# Patient Record
Sex: FEMALE | Race: AMERICAN INDIAN OR ALASKA NATIVE | NOT HISPANIC OR LATINO | Employment: FULL TIME | URBAN - METROPOLITAN AREA
[De-identification: names, ages, dates, MRNs, and addresses within clinical notes are randomized per-mention and may not be internally consistent; named-entity substitution may affect disease eponyms.]

---
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
## 2021-09-21 ENCOUNTER — TELEPHONE (OUTPATIENT)
Dept: SCHEDULING | Facility: IMAGING CENTER | Age: 21
End: 2021-09-21

## 2021-10-21 ENCOUNTER — OFFICE VISIT (OUTPATIENT)
Dept: MEDICAL GROUP | Facility: LAB | Age: 21
End: 2021-10-21
Payer: OTHER GOVERNMENT

## 2021-10-21 ENCOUNTER — HOSPITAL ENCOUNTER (OUTPATIENT)
Dept: LAB | Facility: MEDICAL CENTER | Age: 21
End: 2021-10-21
Attending: FAMILY MEDICINE

## 2021-10-21 VITALS
TEMPERATURE: 98.5 F | WEIGHT: 202.6 LBS | HEART RATE: 74 BPM | OXYGEN SATURATION: 97 % | HEIGHT: 66 IN | RESPIRATION RATE: 12 BRPM | BODY MASS INDEX: 32.56 KG/M2 | DIASTOLIC BLOOD PRESSURE: 82 MMHG | SYSTOLIC BLOOD PRESSURE: 126 MMHG

## 2021-10-21 DIAGNOSIS — Z13.1 SCREENING FOR DIABETES MELLITUS: ICD-10-CM

## 2021-10-21 DIAGNOSIS — Z13.0 SCREENING FOR DEFICIENCY ANEMIA: ICD-10-CM

## 2021-10-21 DIAGNOSIS — Z13.29 SCREENING FOR THYROID DISORDER: ICD-10-CM

## 2021-10-21 DIAGNOSIS — Z13.21 ENCOUNTER FOR VITAMIN DEFICIENCY SCREENING: ICD-10-CM

## 2021-10-21 DIAGNOSIS — Z13.220 SCREENING FOR HYPERLIPIDEMIA: ICD-10-CM

## 2021-10-21 DIAGNOSIS — H53.9 VISUAL CHANGES: ICD-10-CM

## 2021-10-21 LAB
25(OH)D3 SERPL-MCNC: 11 NG/ML (ref 30–100)
ALBUMIN SERPL BCP-MCNC: 4.2 G/DL (ref 3.2–4.9)
ALBUMIN/GLOB SERPL: 1.3 G/DL
ALP SERPL-CCNC: 92 U/L (ref 30–99)
ALT SERPL-CCNC: 50 U/L (ref 2–50)
ANION GAP SERPL CALC-SCNC: 9 MMOL/L (ref 7–16)
AST SERPL-CCNC: 29 U/L (ref 12–45)
BASOPHILS # BLD AUTO: 0.5 % (ref 0–1.8)
BASOPHILS # BLD: 0.03 K/UL (ref 0–0.12)
BILIRUB SERPL-MCNC: 0.6 MG/DL (ref 0.1–1.5)
BUN SERPL-MCNC: 11 MG/DL (ref 8–22)
CALCIUM SERPL-MCNC: 9.4 MG/DL (ref 8.5–10.5)
CHLORIDE SERPL-SCNC: 104 MMOL/L (ref 96–112)
CHOLEST SERPL-MCNC: 201 MG/DL (ref 100–199)
CO2 SERPL-SCNC: 25 MMOL/L (ref 20–33)
CREAT SERPL-MCNC: 0.65 MG/DL (ref 0.5–1.4)
EOSINOPHIL # BLD AUTO: 0.09 K/UL (ref 0–0.51)
EOSINOPHIL NFR BLD: 1.4 % (ref 0–6.9)
ERYTHROCYTE [DISTWIDTH] IN BLOOD BY AUTOMATED COUNT: 42 FL (ref 35.9–50)
FASTING STATUS PATIENT QL REPORTED: NORMAL
GLOBULIN SER CALC-MCNC: 3.3 G/DL (ref 1.9–3.5)
GLUCOSE SERPL-MCNC: 109 MG/DL (ref 65–99)
HCT VFR BLD AUTO: 47 % (ref 37–47)
HDLC SERPL-MCNC: 34 MG/DL
HGB BLD-MCNC: 15.8 G/DL (ref 12–16)
IMM GRANULOCYTES # BLD AUTO: 0.02 K/UL (ref 0–0.11)
IMM GRANULOCYTES NFR BLD AUTO: 0.3 % (ref 0–0.9)
LDLC SERPL CALC-MCNC: 149 MG/DL
LYMPHOCYTES # BLD AUTO: 2.62 K/UL (ref 1–4.8)
LYMPHOCYTES NFR BLD: 40.9 % (ref 22–41)
MCH RBC QN AUTO: 28.2 PG (ref 27–33)
MCHC RBC AUTO-ENTMCNC: 33.6 G/DL (ref 33.6–35)
MCV RBC AUTO: 83.9 FL (ref 81.4–97.8)
MONOCYTES # BLD AUTO: 0.43 K/UL (ref 0–0.85)
MONOCYTES NFR BLD AUTO: 6.7 % (ref 0–13.4)
NEUTROPHILS # BLD AUTO: 3.21 K/UL (ref 2–7.15)
NEUTROPHILS NFR BLD: 50.2 % (ref 44–72)
NRBC # BLD AUTO: 0 K/UL
NRBC BLD-RTO: 0 /100 WBC
PLATELET # BLD AUTO: 222 K/UL (ref 164–446)
PMV BLD AUTO: 10.6 FL (ref 9–12.9)
POTASSIUM SERPL-SCNC: 4.6 MMOL/L (ref 3.6–5.5)
PROT SERPL-MCNC: 7.5 G/DL (ref 6–8.2)
RBC # BLD AUTO: 5.6 M/UL (ref 4.2–5.4)
SODIUM SERPL-SCNC: 138 MMOL/L (ref 135–145)
TRIGL SERPL-MCNC: 92 MG/DL (ref 0–149)
TSH SERPL DL<=0.005 MIU/L-ACNC: 2.95 UIU/ML (ref 0.38–5.33)
WBC # BLD AUTO: 6.4 K/UL (ref 4.8–10.8)

## 2021-10-21 PROCEDURE — 84443 ASSAY THYROID STIM HORMONE: CPT

## 2021-10-21 PROCEDURE — 36415 COLL VENOUS BLD VENIPUNCTURE: CPT

## 2021-10-21 PROCEDURE — 80053 COMPREHEN METABOLIC PANEL: CPT

## 2021-10-21 PROCEDURE — 80061 LIPID PANEL: CPT

## 2021-10-21 PROCEDURE — 99203 OFFICE O/P NEW LOW 30 MIN: CPT | Performed by: FAMILY MEDICINE

## 2021-10-21 PROCEDURE — 85025 COMPLETE CBC W/AUTO DIFF WBC: CPT

## 2021-10-21 PROCEDURE — 82306 VITAMIN D 25 HYDROXY: CPT

## 2021-10-21 NOTE — PATIENT INSTRUCTIONS
Mediterranean Diet  A Mediterranean diet refers to food and lifestyle choices that are based on the traditions of countries located on the [place]. This way of eating has been shown to help prevent certain conditions and improve outcomes for people who have chronic diseases, like kidney disease and heart disease.  What are tips for following this plan?  Lifestyle  · Cook and eat meals together with your family, when possible.  · Drink enough fluid to keep your urine clear or pale yellow.  · Be physically active every day. This includes:  ? Aerobic exercise like running or swimming.  ? Leisure activities like gardening, walking, or housework.  · Get 7-8 hours of sleep each night.  · If recommended by your health care provider, drink red wine in moderation. This means 1 glass a day for nonpregnant women and 2 glasses a day for men. A glass of wine equals 5 oz (150 mL).  Reading food labels    · Check the serving size of packaged foods. For foods such as rice and pasta, the serving size refers to the amount of cooked product, not dry.  · Check the total fat in packaged foods. Avoid foods that have saturated fat or trans fats.  · Check the ingredients list for added sugars, such as corn syrup.  Shopping  · At the grocery store, buy most of your food from the areas near the walls of the store. This includes:  ? Fresh fruits and vegetables (produce).  ? Grains, beans, nuts, and seeds. Some of these may be available in unpackaged forms or large amounts (in bulk).  ? Fresh seafood.  ? Poultry and eggs.  ? Low-fat dairy products.  · Buy whole ingredients instead of prepackaged foods.  · Buy fresh fruits and vegetables in-season from local farmers markets.  · Buy frozen fruits and vegetables in resealable bags.  · If you do not have access to quality fresh seafood, buy precooked frozen shrimp or canned fish, such as tuna, salmon, or sardines.  · Buy small amounts of raw or cooked vegetables, salads, or olives from  the deli or salad bar at your store.  · Stock your pantry so you always have certain foods on hand, such as olive oil, canned tuna, canned tomatoes, rice, pasta, and beans.  Cooking  · Cook foods with extra-virgin olive oil instead of using butter or other vegetable oils.  · Have meat as a side dish, and have vegetables or grains as your main dish. This means having meat in small portions or adding small amounts of meat to foods like pasta or stew.  · Use beans or vegetables instead of meat in common dishes like chili or lasagna.  · [place] with different cooking methods. Try roasting or broiling vegetables instead of steaming or sautéeing them.  · Add frozen vegetables to soups, stews, pasta, or rice.  · Add nuts or seeds for added healthy fat at each meal. You can add these to yogurt, salads, or vegetable dishes.  · Marinate fish or vegetables using olive oil, lemon juice, garlic, and fresh herbs.  Meal planning    · Plan to eat 1 vegetarian meal one day each week. Try to work up to 2 vegetarian meals, if possible.  · Eat seafood 2 or more times a week.  · Have healthy snacks readily available, such as:  ? Vegetable sticks with hummus.  ? Greek yogurt.  ? Fruit and nut trail mix.  · Eat balanced meals throughout the week. This includes:  ? Fruit: 2-3 servings a day  ? Vegetables: 4-5 servings a day  ? Low-fat dairy: 2 servings a day  ? Fish, poultry, or lean meat: 1 serving a day  ? Beans and legumes: 2 or more servings a week  ? Nuts and seeds: 1-2 servings a day  ? Whole grains: 6-8 servings a day  ? Extra-virgin olive oil: 3-4 servings a day  · Limit red meat and sweets to only a few servings a month  What are my food choices?  · Mediterranean diet  ? Recommended  § Grains: Whole-grain pasta. Brown rice. Bulgar wheat. Polenta. Couscous. Whole-wheat bread. Oatmeal. Quinoa.  § Vegetables: Artichokes. Beets. Broccoli. Cabbage. Carrots. Eggplant. Green beans. Chard. Kale. Spinach. Onions. Leeks. Peas. Squash.  Tomatoes. Peppers. Radishes.  § Fruits: Apples. Apricots. Avocado. Berries. Bananas. Cherries. Dates. Figs. Grapes. Stephie. Melon. Oranges. Peaches. Plums. Pomegranate.  § Meats and other protein foods: Beans. Almonds. Sunflower seeds. Pine nuts. Peanuts. Cod. [place]. Scallops. Shrimp. Tuna. Tilapia. Clams. Oysters. Eggs.  § Dairy: Low-fat milk. Cheese. Greek yogurt.  § Beverages: Water. Red wine. Herbal tea.  § Fats and oils: Extra virgin olive oil. Avocado oil. Grape seed oil.  § Sweets and desserts: Greek yogurt with honey. Baked apples. Poached pears. Trail mix.  § Seasoning and other foods: Basil. Cilantro. Coriander. Cumin. Mint. Parsley. Bhaskar. Rosemary. Tarragon. Garlic. Oregano. Thyme. Pepper. Balsalmic vinegar. Tahini. Hummus. Tomato sauce. Olives. Mushrooms.  ? Limit these  § Grains: Prepackaged pasta or rice dishes. Prepackaged cereal with added sugar.  § Vegetables: Deep fried potatoes (french fries).  § Fruits: Fruit canned in syrup.  § Meats and other protein foods: Beef. Pork. Lamb. Poultry with skin. Hot dogs. Georges.  § Dairy: Ice cream. Sour cream. Whole milk.  § Beverages: Juice. Sugar-sweetened soft drinks. Beer. Liquor and spirits.  § Fats and oils: Butter. Canola oil. Vegetable oil. Beef fat (tallow). Lard.  § Sweets and desserts: Cookies. Cakes. Pies. Candy.  § Seasoning and other foods: Mayonnaise. Premade sauces and marinades.  The items listed may not be a complete list. Talk with your dietitian about what dietary choices are right for you.  Summary  · The Mediterranean diet includes both food and lifestyle choices.  · Eat a variety of fresh fruits and vegetables, beans, nuts, seeds, and whole grains.  · Limit the amount of red meat and sweets that you eat.  · Talk with your health care provider about whether it is safe for you to drink red wine in moderation. This means 1 glass a day for nonpregnant women and 2 glasses a day for men. A glass of wine equals 5 oz (150 mL).  This information  is not intended to replace advice given to you by your health care provider. Make sure you discuss any questions you have with your health care provider.  Document Released: 08/10/2017 Document Revised: 08/17/2017 Document Reviewed: 08/10/2017  Elsevier Patient Education © 2020 Elsevier Inc.

## 2021-10-21 NOTE — ASSESSMENT & PLAN NOTE
Patient has been seeing Dr. Lobato since she was in middle school.  She had some visual changes.  She is unsure if she has amblyopia.  She needs a referral to follow-up with him which she does annually.  We do not have any records on that.  She does wear glasses.

## 2021-10-21 NOTE — PROGRESS NOTES
"Chief Complaint   Patient presents with   • Establish Care         Alia Luis is a 21 y.o. female here to establish care and for evaluation and management of:        HPI:    Visual changes  Patient has been seeing Dr. Lobato since she was in middle school.  She had some visual changes.  She is unsure if she has amblyopia.  She needs a referral to follow-up with him which she does annually.  We do not have any records on that.  She does wear glasses.    Patient is here to establish care.  She does not believe she has ever had any blood work.  She has been healthy overall without any real complaints.  No family history is known because she is adopted.  No Known Allergies    Current medicines (including changes today)  No current outpatient medications on file.     No current facility-administered medications for this visit.     She  has no past medical history on file.  She  has a past surgical history that includes foot surgery (Right).  Social History     Tobacco Use   • Smoking status: Never Smoker   • Smokeless tobacco: Never Used   Vaping Use   • Vaping Use: Never used   Substance Use Topics   • Alcohol use: Yes     Alcohol/week: 1.8 oz     Types: 3 Standard drinks or equivalent per week   • Drug use: Never     Social History     Social History Narrative   • Not on file     Family History   Family history unknown: Yes     No family status information on file.         ROS  No fever or chills.  No nausea or vomiting.  No chest pain or palpitations.  No cough or SOB.  No pain with urination or hematuria.  No black or bloody stools.  All other systems reviewed and are negative     Objective:     /82 (BP Location: Right arm, Patient Position: Sitting, BP Cuff Size: Adult)   Pulse 74   Temp 36.9 °C (98.5 °F) (Temporal)   Resp 12   Ht 1.676 m (5' 6\")   Wt 91.9 kg (202 lb 9.6 oz)   SpO2 97%  Body mass index is 32.7 kg/m².  Physical Exam:      Well developed, well nourished.  Alert, oriented in no " acute distress.  Psych: Eye contact is good, speech goal directed, affect calm  Eyes: conjunctiva non-injected, sclera non-icteric.  Neck Supple.  No adenopathy or masses in the neck or supraclavicular regions. No thyromegaly  Lungs: clear to auscultation bilaterally with good excursion. No wheezes or rhonchi  CV: regular rate and rhythm. No murmur      Assessment and Plan:   The following treatment plan was discussed    1. Visual changes  Refer to ophthalmology for further evaluation and treatment.  We will work to get the past records from Dr. Lobato's office  - REFERRAL TO OPHTHALMOLOGY    2. Screening for deficiency anemia  Screening labs ordered.  Await results for counseling.    - CBC WITH DIFFERENTIAL; Future    3. Screening for diabetes mellitus  Screening labs ordered.  Await results for counseling.    - Comp Metabolic Panel; Future    4. Screening for hyperlipidemia  Screening labs ordered.  Await results for counseling.    - Lipid Profile; Future    5. Encounter for vitamin deficiency screening  Screening labs ordered.  Await results for counseling.    - VITAMIN D,25 HYDROXY; Future    6. Screening for thyroid disorder  Screening labs ordered.  Await results for counseling.    - TSH; Future    7. BMI 32.0-32.9,adult  Encourage weight loss for overall health.  Mediterranean eating plan information given.  Check labs to rule out hyperlipidemia or elevated blood sugars given increased BMI.      Records requested.    Any change or worsening of signs or symptoms, patient encouraged to follow-up or report to the emergency room for further evaluation. Patient understands and agrees.    Followup: Return in about 1 year (around 10/21/2022).

## 2021-10-21 NOTE — LETTER
[facility]  Mela Hein M.D.  [address]  [place] [address]  Fax: [phone]   Authorization for Release/Disclosure of   Protected Health Information   Name: GUILHERME HUERTA : 2000 SSN: [id number]   Address: [address] [place] [address] [place] [address] Phone:    [phone] (home) [phone] (work)   I authorize the entity listed below to release/disclose the PHI below to:   [facility]/Mela Hein M.D. and Mela Hein M.D.   Provider or Entity Name:  Jimbo Murillo Jr., M.D.    Address   City, State, Zip   Phone:      Fax:  : [phone]   Reason for request: continuity of care   Information to be released:    [  ] LAST COLONOSCOPY,  including any PATH REPORT and follow-up  [  ] LAST FIT/COLOGUARD RESULT [  ] LAST DEXA  [  ] LAST MAMMOGRAM  [  ] LAST PAP  [  ] LAST LABS [  ] RETINA EXAM REPORT  [  ] IMMUNIZATION RECORDS  [ X ] Release all info      [  ] Check here and initial the line next to each item to release ALL health information INCLUDING  _____ Care and treatment for drug and / or alcohol abuse  _____ HIV testing, infection status, or AIDS  _____ Genetic Testing    DATES OF SERVICE OR TIME PERIOD TO BE DISCLOSED: _____________  I understand and acknowledge that:  * This Authorization may be revoked at any time by you in writing, except if your health information has already been used or disclosed.  * Your health information that will be used or disclosed as a result of you signing this authorization could be re-disclosed by the recipient. If this occurs, your re-disclosed health information may no longer be protected by State or Federal laws.  * You may refuse to sign this Authorization. Your refusal will not affect your ability to obtain treatment.  * This Authorization becomes effective upon signing and will  on (date) __________.      If no date is indicated, this Authorization will  one (1) year from the signature date.    Name: Guilherme  Toby    Continuity of Care    Date:     10/21/2021       PLEASE FAX REQUESTED RECORDS BACK TO: [phone]

## 2021-10-25 ENCOUNTER — TELEPHONE (OUTPATIENT)
Dept: MEDICAL GROUP | Facility: LAB | Age: 21
End: 2021-10-25

## 2021-10-25 NOTE — TELEPHONE ENCOUNTER
----- Message from Mela Hein M.D. sent at 10/25/2021  3:52 PM PDT -----  Please have patient make an appointment to discuss her lab results.  Mela Hein M.D.

## 2023-10-27 ENCOUNTER — OCCUPATIONAL MEDICINE (OUTPATIENT)
Dept: URGENT CARE | Facility: CLINIC | Age: 23
End: 2023-10-27
Payer: COMMERCIAL

## 2023-10-27 ENCOUNTER — APPOINTMENT (OUTPATIENT)
Dept: RADIOLOGY | Facility: IMAGING CENTER | Age: 23
End: 2023-10-27
Attending: NURSE PRACTITIONER

## 2023-10-27 VITALS
BODY MASS INDEX: 31.82 KG/M2 | OXYGEN SATURATION: 96 % | RESPIRATION RATE: 14 BRPM | HEIGHT: 66 IN | TEMPERATURE: 97.5 F | SYSTOLIC BLOOD PRESSURE: 120 MMHG | HEART RATE: 70 BPM | WEIGHT: 198 LBS | DIASTOLIC BLOOD PRESSURE: 80 MMHG

## 2023-10-27 DIAGNOSIS — S89.91XA KNEE INJURIES, RIGHT, INITIAL ENCOUNTER: ICD-10-CM

## 2023-10-27 DIAGNOSIS — S86.911A KNEE STRAIN, RIGHT, INITIAL ENCOUNTER: ICD-10-CM

## 2023-10-27 PROCEDURE — 3074F SYST BP LT 130 MM HG: CPT | Performed by: NURSE PRACTITIONER

## 2023-10-27 PROCEDURE — 99213 OFFICE O/P EST LOW 20 MIN: CPT | Performed by: NURSE PRACTITIONER

## 2023-10-27 PROCEDURE — 73564 X-RAY EXAM KNEE 4 OR MORE: CPT | Mod: TC,FY,RT | Performed by: RADIOLOGY

## 2023-10-27 PROCEDURE — 3079F DIAST BP 80-89 MM HG: CPT | Performed by: NURSE PRACTITIONER

## 2023-10-27 ASSESSMENT — ENCOUNTER SYMPTOMS: SENSORY CHANGE: 1

## 2023-10-27 NOTE — LETTER
[facility] [place]  [address] - [place] [address]  Phone:  [phone] - Fax:  [phone]   [facility] Progress Report and Disability Certification  Date of Service: 10/27/2023   No Show:  No  Date / Time of Next Visit: 11/3/2023 @ 2:30 [facility]   Claim Information   Patient Name: Jessica Luis  Claim Number:  [id number]   Employer:   [employer] Date of Injury: 8/2/2023     Insurer / TPA: [employer]  ID / SSN:     Occupation: Tech  Diagnosis: Diagnoses of Knee injuries, right, initial encounter and Knee strain, right, initial encounter were pertinent to this visit.    Medical Information   Related to Industrial Injury? Yes    Subjective Complaints:  DOI: 8/2/2023. Patient states she slipped while e-fishing, and came down directly on her right knee. Pain to knee cap area, radiates through joint. Has pain daily, stating some days are worse than others. Reports having numbness in the knee after a full days work. Pain 4/10 at rest, increased with walking and certain positions, such as driving. Knee had given out last Sunday.  Not currently taking medications for symptoms. No hx of right knee injury or pain.   Objective Findings: Physical Exam  Vitals reviewed.   Cardiovascular:      Pulses:           Dorsalis pedis pulses are 2+ on the right side.   Musculoskeletal:         General: Tenderness present. No swelling or deformity. Normal range of motion.      Right knee: No swelling, deformity, effusion, erythema, ecchymosis or lacerations. Normal range of motion. Tenderness present. No medial joint line, lateral joint line, MCL, LCL or patellar tendon tenderness. Normal alignment and normal patellar mobility.      Comments: Mild patella tenderness to palpation. Able to bear weight, steady gait. Positive Adia test.   Neurological:      Mental Status: She is alert and oriented to person, place, and time.         Pre-Existing Condition(s): None reported.   Assessment:   Initial Visit    Status: Additional Care Required  Permanent Disability:No    Plan: MedicationDiagnosticsTransfer Care    Diagnostics: X-ray  Comments:1.  Unremarkable right knee series.    Comments:  - DX-KNEE COMPLETE 4+ RIGHT; Future  - Referral to Occupational Medicine    Disability Information   Status: Released to Restricted Duty    From:  10/27/2023  Through: 11/3/2023 Restrictions are: Temporary   Physical Restrictions   Sitting:    Standing:    Stoopin hrs/day Bending:  < or = to 1 hr/day  Comments:Avoid positions and activities that place excessive pressure on the knee joint: Such activities include: squatting, kneeling, twisting and pivoting, repetitive bending of right knee (eg, stairs).   Squattin hrs/day Walking:    Climbing:  < or = to 1 hr/day Pushing:      Pulling:    Other:    Reaching Above Shoulder (L):   Reaching Above Shoulder (R):       Reaching Below Shoulder (L):    Reaching Below Shoulder (R):      Not to exceed Weight Limits   Carrying(hrs):   Weight Limit(lb): < or = to 25 pounds Lifting(hrs):   Weight  Limit(lb): < or = to 25 pounds   Comments: -Right knee brace  -NSAID's (ibuprofen 600 mg every 6 hours with a snack) and tylenol as directed for pain and inflammation.   -RICE Therapy: Rest, Ice, Compression, Elevation  -Compression with an elastic bandage for swelling.  -Straight leg raising exercises as tolerated.  -Follow up in 1 week.     Possible meniscal injury. Advised continued conservative measures, including incorporating antiinflammatories, with f/u in 1 week. Referred to occupation medicine due to duration of symptoms.     Repetitive Actions   Hands: i.e. Fine Manipulations from Grasping:     Feet: i.e. Operating Foot Controls:     Driving / Operate Machinery:     Health Care Provider’s Original or Electronic Signature  SHAMIKA Pickett Health Care Provider’s Original or Electronic Signature    Fer  DO Sveta MPH     Clinic Name / Location: [facility] [place]  [address]  [place] [address] Clinic Phone Number: Dept: [phone]   Appointment Time: 8:30 Am Visit Start Time: 8:59 AM   Check-In Time:  8:52 Am Visit Discharge Time: 10:06 AM   Original-Treating Physician or Chiropractor    Page 2-Insurer/TPA    Page 3-Employer    Page 4-Employee

## 2023-10-27 NOTE — PROGRESS NOTES
"Subjective     Jessica Luis is a 23 y.o. female who presents with Knee Injury (W/C DOI 8/2/23 Work related RT knee injury, sharp pain around knee cap and around back of the leg.)            DOI: 8/2/2023. Patient states she slipped while e-fishing, and came down directly on her right knee. Pain to knee cap area, radiates through joint. Has pain daily, stating some days are worse than others. Reports having numbness in the knee after a full days work. Pain 4/10 at rest, increased with walking and certain positions, such as driving. Knee had given out last Sunday.  Not currently taking medications for symptoms. No hx of right knee injury or pain.    Knee Injury        Review of Systems   Musculoskeletal:  Positive for joint pain.   Neurological:  Positive for sensory change.   All other systems reviewed and are negative.             Objective     /80   Pulse 70   Temp 36.4 °C (97.5 °F) (Temporal)   Resp 14   Ht 1.676 m (5' 6\")   Wt 89.8 kg (198 lb)   SpO2 96%   BMI 31.96 kg/m²      Physical Exam  Vitals reviewed.   Constitutional:       General: She is not in acute distress.  Cardiovascular:      Pulses:           Dorsalis pedis pulses are 2+ on the right side.   Pulmonary:      Effort: Pulmonary effort is normal.   Musculoskeletal:         General: Tenderness present. No swelling or deformity. Normal range of motion.      Right knee: No swelling, deformity, effusion, erythema, ecchymosis or lacerations. Normal range of motion. Tenderness present. No medial joint line, lateral joint line, MCL, LCL or patellar tendon tenderness. Normal alignment and normal patellar mobility.      Comments: Mild patella tenderness to palpation. Able to bear weight, steady gait. Positive Adia test.   Skin:     General: Skin is warm and dry.      Findings: No bruising or erythema.   Neurological:      Mental Status: She is alert and oriented to person, place, and time.                             Assessment " & Plan     1. Knee injuries, right, initial encounter  - DX-KNEE COMPLETE 4+ RIGHT; Future  - Referral to Occupational Medicine    2. Knee strain, right, initial encounter  - Referral to Occupational Medicine  DX-KNEE COMPLETE 4+ RIGHT    Result Date: 10/27/2023  10/27/2023 9:13 AM HISTORY/REASON FOR EXAM:  Right knee pain after fall 3 months ago. TECHNIQUE/EXAM DESCRIPTION AND NUMBER OF VIEWS:  4 views of the RIGHT knee. COMPARISON: None FINDINGS: There is no significant joint effusion. There is no evidence of displaced  fracture or dislocation. There is no significant degenerative change.     1.  Unremarkable right knee series.    -Right knee brace  -NSAID's (ibuprofen 600 mg every 6 hours with a snack) and tylenol as directed for pain and inflammation.   -RICE Therapy: Rest, Ice, Compression, Elevation  -Compression with an elastic bandage for swelling.  -Straight leg raising exercises as tolerated.  -Follow up in 1 week.     Avoid positions and activities that place excessive pressure on the knee joint: Such activities include: squatting, kneeling, twisting and pivoting, repetitive bending of right knee (eg, stairs).    Possible meniscal injury. Advised continued conservative measures, including incorporating antiinflammatories, with f/u in 1 week. Referred to occupation medicine due to duration of symptoms.

## 2023-10-27 NOTE — LETTER
"    EMPLOYEE’S CLAIM FOR COMPENSATION/ REPORT OF INITIAL TREATMENT  FORM C-4  PLEASE TYPE OR PRINT    EMPLOYEE’S CLAIM - PROVIDE ALL INFORMATION REQUESTED   First Name                    TROY Lopez Last Name  Toby Birthdate                    2000                Sex  []M  [x]F Claim Number (Insurer’s Use Only)  [id number]   Home Address  [address]  Age  23 y.o. 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     Date of Injury (if applicable) 8/2/2023               Hours Injury (if applicable)            am               pm Date Employer Notified  10/20/2023 Last Day of Work after Injury or Occupational Disease  10/26/2023 Supervisor to Whom Injury     Reported  Rnezo Haji   Address or Location of Accident (if applicable)  Work [1]   What were you doing at the time of accident? (if applicable)  E-Fishing    How did this injury or occupational disease occur? (Be specific and answer in detail. Use additional sheet if necessary)  I was E-Fishing and I slept hit my right knee   If you believe that you have an occupational disease, when did you first have knowledge of the disability and its relationship to your employment?  N/A Witnesses to the Accident (if applicable)  Johnnie Littlearest      Nature of Injury or Occupational Disease  Workers' Compensation  Part(s) of Body Injured or Affected  Knee (R) N/A N/A    I " CERTIFY THAT THE ABOVE IS TRUE AND CORRECT TO T HE BEST OF MY KNOWLEDGE AND THAT I HAVE PROVIDED THIS INFORMATION IN ORDER TO OBTAIN THE BENEFITS OF [place], OR CHAPTER 617 OF [place]).  I HEREBY AUTHORIZE ANY PHYSICIAN, CHIROPRACTOR, SURGEON, PRACTITIONER OR ANY OTHER PERSON, ANY HOSPITAL, INCLUDING [facility] OR [facility], ANY  MEDICAL SERVICE ORGANIZATION, ANY INSURANCE COMPANY, OR OTHER INSTITUTION OR ORGANIZATION TO RELEASE TO EACH OTHER, ANY MEDICAL OR OTHER INFORMATION, INCLUDING BENEFITS PAID OR PAYABLE, PERTINENT TO THIS INJURY OR DISEASE, EXCEPT INFORMATION RELATIVE TO DIAGNOSIS, TREATMENT AND/OR COUNSELING FOR AIDS, PSYCHOLOGICAL CONDITIONS, ALCOHOL OR CONTROLLED SUBSTANCES, FOR WHICH I MUST GIVE SPECIFIC AUTHORIZATION.  A PHOTOSTAT OF THIS AUTHORIZATION SHALL BE VALID AS THE ORIGINAL.   10/27/2023  Date [facility] Employee’s Original Signature   THIS REPORT MUST BE COMPLETED AND MAILED WITHIN 3 WORKING DAYS OF TREATMENT   Place  [facility]    Name of Facility  [facility]   Date 10/27/2023 Diagnosis and Description of Injury or Occupational Disease  (S89.91XA) Knee injuries, right, initial encounter  (S86.911A) Knee strain, right, initial encounter  Diagnoses of Knee injuries, right, initial encounter and Knee strain, right, initial encounter were pertinent to this visit. Is there evidence that the injured employee was under the influence of alcohol and/or another controlled substance at the time of accident?  []No  [] Yes (if yes, please explain)   Hour 8:59 AM  No   Treatment: - DX-KNEE COMPLETE 4+ RIGHT; Future  - Referral to Occupational Medicine  -Right knee brace  -NSAID's (ibuprofen 600 mg every 6 hours with a snack) and tylenol as directed for pain and inflammation.   -RICE Therapy: Rest, Ice, Compression, Elevation  -Compression with an elastic bandage for  swelling.  -Straight leg raising exercises as tolerated.  -Follow up in 1 week.     Have you advised the patient to remain off work five days or more?   [] Yes Indicate dates: From   To    []No If no, is the injured employee capable of: [] full duty [] modified duty                                                             No  Yes  If modified duty, specify any limitations / restrictions:  Note d-39                                                                                                                                                                                                                                                                                                                                                                                                               X-Ray Findings: Negative  Comments:Unremarkable right knee series.    From information given by the employee, together with medical evidence, can you directly connect this injury or occupational disease as job incurred?  []Yes   [] No Yes    Is additional medical care by a physician indicated? []Yes [] No  Yes    Do you know of any previous injury or disease contributing to this condition or occupational disease? []Yes [] No (Explain if yes)                          No   Date  10/27/2023 Print Health Care Provider’s Name  SHAMIKA Pickett I certify that the employer’s copy of  this form was delivered to the employer on:   Address  [address] INSURER'S USE ONLY                       [facility]  [address] Provider’s Tax ID Number  [id number]   Telephone  Dept: [phone]    Health Care Provider’s Original or Electronic Signature  e-OC Barr Degree (MD,DO, DC,PARexC,APRN)  APRN  Choose (if applicable)      ORIGINAL - TREATING HEALTHCARE PROVIDER PAGE 2 - INSURER/TPA PAGE 3 - EMPLOYER PAGE 4 - EMPLOYEE             Form C-4 (rev.08/23)        BRIEF DESCRIPTION OF RIGHTS AND  "BENEFITS  (Pursuant to [place]RS 616C.050)    Notice of Injury or Occupational Disease (Incident Report Form C-1): If an injury or occupational disease (OD) arises out of and in the course of employment, you must provide written notice to your employer as soon as practicable, but no later than 7 days after the accident or OD. Your employer shall maintain a sufficient supply of the required forms.    Claim for Compensation (Form C-4): If medical treatment is sought, the form C-4 is available at the place of initial treatment. A completed \"Claim for Compensation\" (Form C-4) must be filed within 90 days after an accident or OD. The treating physician or chiropractor must, within 3 working days after treatment, complete and mail to the employer, the employer's insurer and third-party , the Claim for Compensation.    Medical Treatment: If you require medical treatment for your on-the-job injury or OD, you may be required to select a physician or chiropractor from a list provided by your workers’ compensation insurer, if it has contracted with an Organization for Managed Care (MCO) or Preferred Provider Organization (PPO) or providers of health care. If your employer has not entered into a contract with an MCO or PPO, you may select a physician or chiropractor from the Panel of Physicians and Chiropractors. Any medical costs related to your industrial injury or OD will be paid by your insurer.    Temporary Total Disability (TTD): If your doctor has certified that you are unable to work for a period of at least 5 consecutive days, or 5 cumulative days in a 20-day period, or places restrictions on you that your employer does not accommodate, you may be entitled to TTD compensation.    Temporary Partial Disability (TPD): If the wage you receive upon reemployment is less than the compensation for TTD to which you are entitled, the insurer may be required to pay you TPD compensation to make up the difference. TPD can " only be paid for a maximum of 24 months.    Permanent Partial Disability (PPD): When your medical condition is stable and there is an indication of a PPD as a result of your injury or OD, within 30 days, your insurer must arrange for an evaluation by a rating physician or chiropractor to determine the degree of your PPD. The amount of your PPD award depends on the date of injury, the results of the PPD evaluation, your age and wage.    Permanent Total Disability (PTD): If you are medically certified by a treating physician or chiropractor as permanently and totally disabled and have been granted a PTD status by your insurer, you are entitled to receive monthly benefits not to exceed 66 2/3% of your average monthly wage. The amount of your PTD payments is subject to reduction if you previously received a lump-sum PPD award.    Vocational Rehabilitation Services: You may be eligible for vocational rehabilitation services if you are unable to return to the job due to a permanent physical impairment or permanent restrictions as a result of your injury or occupational disease.    Transportation and Per Heron Reimbursement: You may be eligible for travel expenses and per heron associated with medical treatment.    Reopening: You may be able to reopen your claim if your condition worsens after claim closure.     Appeal Process: If you disagree with a written determination issued by the insurer or the insurer does not respond to your request, you may appeal to the Department of Administration, , by following the instructions contained in your determination letter. You must appeal the determination within 70 days from the date of the determination letter at [address], [place] [address], or [address], [address], [place] [address]. If you disagree with the  decision, you may appeal to the Department of Administration, . You must file  your appeal within 30 days from the date of the  decision letter at [address], [address], [place], [place] [address], or [address], [place], [place] [address]. If you disagree with a decision of an , you may file a petition for judicial review with the District Court. You must do so within 30 days of the Appeal Officer’s decision. You may be represented by an  at your own expense or you may contact the [facility] for possible representation.    [place]  for Injured Workers (NAIW): If you disagree with a  decision, you may request that NAIW represent you without charge at an  Hearing. For information regarding denial of benefits, you may contact the [facility] at: [address], [address], [place] [address], [phone], or [address], [place] [address], [phone]    To File a Complaint with the Division: If you wish to file a complaint with the  of the Division of Industrial Relations (DIR),  please contact the Workers’ Compensation Section, [address], [address], [place], [place] [address], telephone [phone], or [address], [place], [place] [address], telephone [phone].    For assistance with Workers’ Compensation Issues: You may contact the [facility] Office for Consumer Health Assistance, [address], [address], [place] [address], Toll Free [phone], Web site: http://[facility].[place].gov/Programs/REDD E-mail: redd@[facility].[place].gov              __________________________________________________________________                                    _________________            Employee Name / Signature                                                                                                                            Date                                                                                                                                                                                                                               D-2 (rev. 10/20)

## 2023-10-27 NOTE — PATIENT INSTRUCTIONS
-NSAID's (ibuprofen 600 mg every 6 hours with a snack) and tylenol as directed for pain and inflammation.   -RICE Therapy: Rest, Ice, Compression, Elevation  -Compression with an elastic bandage for swelling.  -Straight leg raising exercises as tolerated.  -Follow up in 1 week.     Avoid positions and activities that place excessive pressure on the knee joint: Such activities include: squatting, kneeling, twisting and pivoting, repetitive bending of right knee (eg, stairs).

## 2023-11-03 ENCOUNTER — OCCUPATIONAL MEDICINE (OUTPATIENT)
Dept: OCCUPATIONAL MEDICINE | Facility: CLINIC | Age: 23
End: 2023-11-03
Payer: COMMERCIAL

## 2023-11-03 VITALS
DIASTOLIC BLOOD PRESSURE: 72 MMHG | RESPIRATION RATE: 16 BRPM | TEMPERATURE: 97.3 F | SYSTOLIC BLOOD PRESSURE: 110 MMHG | HEART RATE: 77 BPM | BODY MASS INDEX: 31.34 KG/M2 | OXYGEN SATURATION: 97 % | HEIGHT: 66 IN | WEIGHT: 195 LBS

## 2023-11-03 DIAGNOSIS — S86.911D STRAIN OF RIGHT KNEE, SUBSEQUENT ENCOUNTER: ICD-10-CM

## 2023-11-03 DIAGNOSIS — M25.361 INSTABILITY OF RIGHT KNEE JOINT: ICD-10-CM

## 2023-11-03 PROCEDURE — 99213 OFFICE O/P EST LOW 20 MIN: CPT | Performed by: NURSE PRACTITIONER

## 2023-11-03 PROCEDURE — 3078F DIAST BP <80 MM HG: CPT | Performed by: NURSE PRACTITIONER

## 2023-11-03 PROCEDURE — 3074F SYST BP LT 130 MM HG: CPT | Performed by: NURSE PRACTITIONER

## 2023-11-03 NOTE — LETTER
[address],   [address] [place] [address]  Phone:  [phone] - Fax:  [phone]   Occupational Health [facility] Progress Report and Disability Certification  Date of Service: 11/3/2023   No Show:  No  Date / Time of Next Visit: 12/14/2023 9:45 AM    Claim Information   Patient Name: Jesscia Luis  Claim Number:     Employer:   [employer] Date of Injury: 8/2/2023     Insurer / TPA: [place]  ID / SSN:     Occupation: Tech  Diagnosis: Diagnoses of Strain of right knee, subsequent encounter and Instability of right knee joint were pertinent to this visit.    Medical Information   Related to Industrial Injury? Yes    Subjective Complaints:  DOI: 8/2/2023. Patient states she slipped while e-fishing, and came down directly on her right knee. Today patient states that the symptoms remain unchanged. Pain to knee cap area, radiates through joint. A couple of weeks ago the knee buckled. She has been taking Ibuprofen and wearing a knee brace. She has been able to tolerate light duty. MRI and Orthopedics referral placed.    Objective Findings: Right knee: No swelling, deformity, effusion, erythema, ecchymosis or lacerations. Normal range of motion. Tenderness present. No medial joint line, lateral joint line, MCL, LCL or patellar tendon tenderness. Mild patella tenderness to palpation. Able to bear weight, steady gait. Positive Adia test.    Pre-Existing Condition(s):     Assessment:   Condition Same    Status: Discharged / Care Transfer  Permanent Disability:No    Plan: DiagnosticsTransfer Care    Diagnostics: MRI    Comments:  Follow-up at 6 weeks, unless seen by orthopedics  Restricted duty, per orthopedics  Orthopedic referral placed, transfer care  MRI ordered  Recommend continue with OTC ibuprofen, ice, elevation, and knee brace most of the day  Recommend gentle range of motion and stretching exercises as tolerated     Disability Information   Status: Released to Restricted Duty    From:  11/3/2023  Through: 2023 Restrictions are: Temporary   Physical Restrictions   Sitting:    Standing:    Stooping:    Bending:    Comments:Avoid positions and activities that place excessive pressure on the knee joint: Such activities include: squatting, kneeling, twisting and pivoting, repetitive bending of right knee (eg, stairs)   Squattin hrs/day Walking:    Climbing:  < or = to 1 hr/day Pushing:      Pulling:    Other:    Reaching Above Shoulder (L):   Reaching Above Shoulder (R):       Reaching Below Shoulder (L):    Reaching Below Shoulder (R):      Not to exceed Weight Limits   Carrying(hrs):   Weight Limit(lb): < or = to 25 pounds Lifting(hrs):   Weight  Limit(lb): < or = to 25 pounds   Comments:      Repetitive Actions   Hands: i.e. Fine Manipulations from Grasping:     Feet: i.e. Operating Foot Controls:     Driving / Operate Machinery:     Health Care Provider’s Original or Electronic Signature  SHAMIKA Celis Health Care Provider’s Original or Electronic Signature    Fer Bangura DO MPH     Clinic Name / Location: [address],   [address]  [place] [address] Clinic Phone Number: Dept: [phone]   Appointment Time: 2:30 Pm Visit Start Time: 1:59 PM   Check-In Time:  1:51 Pm Visit Discharge Time:  2:29 PM    Original-Treating Physician or Chiropractor    Page 2-Insurer/TPA    Page 3-Employer    Page 4-Employee

## 2023-11-03 NOTE — PROGRESS NOTES
"Subjective:     Jessica Luis is a 23 y.o. female who presents for Follow-Up (RM 18)      DOI: 8/2/2023. Patient states she slipped while e-fishing, and came down directly on her right knee. Today patient states that the symptoms remain unchanged. Pain to knee cap area, radiates through joint. A couple of weeks ago the knee buckled. She has been taking Ibuprofen and wearing a knee brace. She has been able to tolerate light duty. MRI and Orthopedics referral placed.     ROS: All systems were reviewed on intake form, form was reviewed and signed. See scanned documents in media. Pertinent positives and negatives included in HPI.    PMH: No pertinent past medical history to this problem  MEDS: Medications were reviewed in Epic  ALLERGIES: No Known Allergies  SOCHX: Works as  II at [employer]  FH: No pertinent family history to this problem       Objective:     /72   Pulse 77   Temp 36.3 °C (97.3 °F) (Temporal)   Resp 16   Ht 1.676 m (5' 6\")   Wt 88.5 kg (195 lb)   SpO2 97%   BMI 31.47 kg/m²     [unfilled]    Right knee: No swelling, deformity, effusion, erythema, ecchymosis or lacerations. Normal range of motion. Tenderness present. No medial joint line, lateral joint line, MCL, LCL or patellar tendon tenderness. Mild patella tenderness to palpation. Able to bear weight, steady gait. Positive Adia test.     Assessment/Plan:       1. Strain of right knee, subsequent encounter  - MR-KNEE-W/O RIGHT; Future  - Referral to Radiology  - Referral to Orthopedics    2. Instability of right knee joint  - MR-KNEE-W/O RIGHT; Future  - Referral to Radiology  - Referral to Orthopedics    Released to Restricted Duty FROM 11/3/2023 TO 12/14/2023     Follow-up at 6 weeks, unless seen by orthopedics  Restricted duty, per orthopedics  Orthopedic referral placed, transfer care  MRI ordered  Recommend continue with OTC ibuprofen, ice, elevation, and knee brace most of " the day  Recommend gentle range of motion and stretching exercises as tolerated    Differential diagnosis, natural history, supportive care, and indications for immediate follow-up discussed.    Approximately 25 minutes were spent in reviewing notes, preparing for visit, obtaining history, exam and evaluation, patient counseling/education and post visit documentation/orders.

## 2023-12-14 ENCOUNTER — OCCUPATIONAL MEDICINE (OUTPATIENT)
Dept: OCCUPATIONAL MEDICINE | Facility: CLINIC | Age: 23
End: 2023-12-14
Payer: COMMERCIAL

## 2023-12-14 DIAGNOSIS — S86.911D STRAIN OF RIGHT KNEE, SUBSEQUENT ENCOUNTER: ICD-10-CM

## 2023-12-14 DIAGNOSIS — M25.361 INSTABILITY OF RIGHT KNEE JOINT: ICD-10-CM

## 2023-12-14 PROCEDURE — 99213 OFFICE O/P EST LOW 20 MIN: CPT | Performed by: NURSE PRACTITIONER

## 2023-12-14 ASSESSMENT — ENCOUNTER SYMPTOMS
WEAKNESS: 1
CARDIOVASCULAR NEGATIVE: 1
PSYCHIATRIC NEGATIVE: 1
CONSTITUTIONAL NEGATIVE: 1
MYALGIAS: 1
TINGLING: 0
RESPIRATORY NEGATIVE: 1
SENSORY CHANGE: 0

## 2023-12-14 NOTE — LETTER
[address],   [address] [place] [address]  Phone:  [phone] - Fax:  [phone]   Occupational Health [facility] Progress Report and Disability Certification  Date of Service: 12/14/2023   No Show:  No  Date / Time of Next Visit: 1/25/2024 @ 9:30 AM   Claim Information   Patient Name: Jessica Luis  Claim Number:     Employer:   [employer] Date of Injury: 8/2/2023     Insurer / TPA: [place]  ID / SSN:     Occupation: Tech  Diagnosis: Diagnoses of Strain of right knee, subsequent encounter and Instability of right knee joint were pertinent to this visit.    Medical Information   Related to Industrial Injury? Yes    Subjective Complaints:  DOI: 8/2/2023. Patient states she slipped while e-fishing, and came down directly on her right knee. Today patient states that the symptoms remain unchanged, maybe a little worse. Pain to knee cap area, radiates through joint. Continued intermittent knee buckling. She has been taking Ibuprofen and wearing a knee brace. She has been able to tolerate light duty. MRI and Orthopedics pending. Plan of care discussed with patient.    Objective Findings:    Right knee: No swelling, deformity, effusion, erythema, ecchymosis or lacerations. Normal range of motion. Tenderness present. No medial joint line, lateral joint line, MCL, LCL or patellar tendon tenderness. Mild patella tenderness to palpation. Able to bear weight, steady gait. Positive Adia test.      Pre-Existing Condition(s):     Assessment:   Condition Same    Status: Discharged / Care Transfer  Permanent Disability:No    Plan: Transfer CareDiagnostics    Diagnostics: MRI    Comments:  Follow-up at 6 weeks, unless seen by orthopedics  Restricted duty, per orthopedics  Orthopedic referral pending, transfer care  MRI pending  Recommend continue with OTC ibuprofen, ice, elevation, and knee brace most of the day  Recommend gentle  range of motion and stretching exercises as tolerated      Disability Information   Status: Released to Restricted Duty    From:  2023  Through: 2024 Restrictions are: Temporary   Physical Restrictions   Sitting:    Standing:    Stooping:    Bending:    Comments:Avoid positions and activities that place excessive pressure on the knee joint: Such activities include: squatting, kneeling, twisting and pivoting, repetitive bending of right knee (eg, stairs)   Squattin hrs/day Walking:    Climbing:  < or = to 1 hr/day Pushing:      Pulling:    Other:    Reaching Above Shoulder (L):   Reaching Above Shoulder (R):       Reaching Below Shoulder (L):    Reaching Below Shoulder (R):      Not to exceed Weight Limits   Carrying(hrs):   Weight Limit(lb): < or = to 25 pounds Lifting(hrs):   Weight  Limit(lb): < or = to 25 pounds   Comments:      Repetitive Actions   Hands: i.e. Fine Manipulations from Grasping:     Feet: i.e. Operating Foot Controls:     Driving / Operate Machinery:     Health Care Provider’s Original or Electronic Signature  SHAMIKA Celis Health Care Provider’s Original or Electronic Signature    Fer Bangura DO MPH     Clinic Name / Location: [address],   [address] [facility]  [place] [address] Clinic Phone Number: Dept: [phone]   Appointment Time: 9:45 Am Visit Start Time: 10:05 AM   Check-In Time:  9:48 Am Visit Discharge Time:  10:23 AM   Original-Treating Physician or Chiropractor    Page 2-Insurer/TPA    Page 3-Employer    Page 4-Employee

## 2023-12-14 NOTE — PROGRESS NOTES
Subjective:     Jessica Luis is a 23 y.o. female who presents for Other      DOI: 8/2/2023. Patient states she slipped while e-fishing, and came down directly on her right knee. Today patient states that the symptoms remain unchanged, maybe a little worse. Pain to knee cap area, radiates through joint. Continued intermittent knee buckling. She has been taking Ibuprofen and wearing a knee brace. She has been able to tolerate light duty. MRI and Orthopedics pending. Plan of care discussed with patient.     Review of Systems   Constitutional: Negative.    Respiratory: Negative.     Cardiovascular: Negative.    Musculoskeletal:  Positive for joint pain and myalgias.   Skin: Negative.    Neurological:  Positive for weakness. Negative for tingling and sensory change.   Psychiatric/Behavioral: Negative.         SOCHX: Works as  II at [employer]  FH: No pertinent family history to this problem       Objective:     There were no vitals taken for this visit.    Constitutional: Patient is in no acute distress. Appears well-developed and well-nourished.   Cardiovascular: Normal rate.    Pulmonary/Chest: Effort normal. No respiratory distress.   Neurological: Patient is alert and oriented to person, place, and time.   Skin: Skin is warm and dry.   Psychiatric: Normal mood and affect. Behavior is normal.        Right knee: No swelling, deformity, effusion, erythema, ecchymosis or lacerations. Normal range of motion. Tenderness present. No medial joint line, lateral joint line, MCL, LCL or patellar tendon tenderness. Mild patella tenderness to palpation. Able to bear weight, steady gait. Positive Adia test.       Assessment/Plan:       1. Strain of right knee, subsequent encounter    2. Instability of right knee joint    Released to Restricted Duty FROM 12/14/2023 TO 1/25/2024       Follow-up at 6 weeks, unless seen by orthopedics  Restricted duty, per  orthopedics  Orthopedic referral pending, transfer care  MRI pending  Recommend continue with OTC ibuprofen, ice, elevation, and knee brace most of the day  Recommend gentle range of motion and stretching exercises as tolerated      Differential diagnosis, natural history, supportive care, and indications for immediate follow-up discussed.    Approximately 25 minutes was spent in preparing for visit, obtaining history, exam and evaluation, patient counseling/education and post visit documentation/orders.

## 2024-01-25 ENCOUNTER — OCCUPATIONAL MEDICINE (OUTPATIENT)
Dept: OCCUPATIONAL MEDICINE | Facility: CLINIC | Age: 24
End: 2024-01-25
Payer: COMMERCIAL

## 2024-01-25 VITALS
OXYGEN SATURATION: 98 % | RESPIRATION RATE: 16 BRPM | SYSTOLIC BLOOD PRESSURE: 116 MMHG | WEIGHT: 197.6 LBS | HEART RATE: 73 BPM | BODY MASS INDEX: 31.76 KG/M2 | TEMPERATURE: 98.9 F | DIASTOLIC BLOOD PRESSURE: 76 MMHG | HEIGHT: 66 IN

## 2024-01-25 DIAGNOSIS — S86.911D STRAIN OF RIGHT KNEE, SUBSEQUENT ENCOUNTER: ICD-10-CM

## 2024-01-25 DIAGNOSIS — M25.361 INSTABILITY OF RIGHT KNEE JOINT: ICD-10-CM

## 2024-01-25 PROCEDURE — 3074F SYST BP LT 130 MM HG: CPT | Performed by: NURSE PRACTITIONER

## 2024-01-25 PROCEDURE — 99213 OFFICE O/P EST LOW 20 MIN: CPT | Performed by: NURSE PRACTITIONER

## 2024-01-25 PROCEDURE — 3078F DIAST BP <80 MM HG: CPT | Performed by: NURSE PRACTITIONER

## 2024-01-25 ASSESSMENT — ENCOUNTER SYMPTOMS
PSYCHIATRIC NEGATIVE: 1
RESPIRATORY NEGATIVE: 1
CONSTITUTIONAL NEGATIVE: 1
MYALGIAS: 1
TINGLING: 0
ROS SKIN COMMENTS: SWELLING
WEAKNESS: 1
CARDIOVASCULAR NEGATIVE: 1
SENSORY CHANGE: 0

## 2024-01-25 NOTE — LETTER
[address],   [address] [place] [address]  Phone:  [phone] - Fax:  [phone]   [facility] Progress Report and Disability Certification  Date of Service: 1/25/2024   No Show:  No  Date / Time of Next Visit: 3/7/2024@7:45AM   Claim Information   Patient Name: Jessica Luis  Claim Number:     Employer:   [employer]   Date of Injury: 8/2/2023     Insurer / TPA: Kerry [place]  ID / SSN:     Occupation: Tech  Diagnosis: Diagnoses of Strain of right knee, subsequent encounter and Instability of right knee joint were pertinent to this visit.    Medical Information   Related to Industrial Injury? Yes    Subjective Complaints:  DOI: 8/2/2023. Patient states she slipped while e-fishing, and came down directly on her right knee. Today patient states that the symptoms remain unchanged, maybe a little worse. Pain to knee cap area, radiates through joint. Continued intermittent knee buckling. She has been taking Ibuprofen and wearing a knee brace. She has been able to tolerate light duty. MRI results reviewed with patient. Orthopedics pending;  to schedule per notes. Plan of care discussed with patient.    Objective Findings: Right knee: No swelling, deformity, effusion, erythema, ecchymosis or lacerations. Normal range of motion. Tenderness present. No medial joint line, lateral joint line, MCL, LCL or patellar tendon tenderness. Mild patella tenderness to palpation. Able to bear weight, steady gait. Positive Adia test.     MRI Knee:  1.  Lateral patellar tilt and subluxation along with patella christy suggestion patellofemoral pain syndrome.  There is partial-thickness chondromalacia along the lateral facet of the patella.  There is associated free femoral fat pad impingement.  2.  Patellar tendon/lateral femoral condyle friction syndrome.  3.  No internal derangement.   Pre-Existing Condition(s):      Assessment:   Condition Same    Status: Discharged / Care Transfer  Permanent Disability:No    [facility]    Diagnostics:      Comments:  Follow-up at 6 weeks, unless seen by orthopedics  Restricted duty, per orthopedics  Orthopedic pending scheduling by , transfer care  MRI completing  Physical therapy appointments as scheduled  Recommend continue with OTC ibuprofen, ice, elevation, and knee brace most of the day  Recommend gentle range of motion and stretching exercises as tolerated      Disability Information   Status: Released to Restricted Duty    From:  1/25/2024  Through: 3/7/2024 Restrictions are: Temporary   Physical Restrictions   Sitting:    Standing:    Stooping:    Bending:    Comments:Avoid positions and activities that place excessive pressure on the knee joint: Such activities include: squatting, kneeling, twisting and pivoting, repetitive bending of right knee (eg, stairs)   Squatting:  < or = to 1 hr/day Walking:    Climbing:  < or = to 1 hr/day Pushing:      Pulling:    Other:    Reaching Above Shoulder (L):   Reaching Above Shoulder (R):       Reaching Below Shoulder (L):    Reaching Below Shoulder (R):      Not to exceed Weight Limits   Carrying(hrs):   Weight Limit(lb): < or = to 25 pounds Lifting(hrs):   Weight  Limit(lb): < or = to 25 pounds   Comments:      Repetitive Actions   Hands: i.e. Fine Manipulations from Grasping:     Feet: i.e. Operating Foot Controls:     Driving / Operate Machinery:     Health Care Provider’s Original or Electronic Signature  SHAMIKA Celis Health Care Provider’s Original or Electronic Signature    Fer Bangura DO MPH     Clinic Name / Location: [place] [address]  [place] [address] Clinic Phone Number: Dept: [phone]   Appointment Time: 9:30 Am Visit Start Time:    Check-In Time:  8:52 Am Visit Discharge Time: 9:28 AM   Original-Treating Physician or Chiropractor    Page 2-Insurer/TPA     Page 3-Employer    Page 4-Employee

## 2024-01-25 NOTE — PROGRESS NOTES
"Subjective:     Jessica Luis is a 23 y.o. female who presents for Follow-Up ( - Follow Up)      DOI: 8/2/2023. Patient states she slipped while e-fishing, and came down directly on her right knee. Today patient states that the symptoms remain unchanged, maybe a little worse. Pain to knee cap area, radiates through joint. Continued intermittent knee buckling. She has been taking Ibuprofen and wearing a knee brace. She has been able to tolerate light duty. MRI results reviewed with patient. Orthopedics pending;  to schedule per notes. Plan of care discussed with patient.     Review of Systems   Constitutional: Negative.    Respiratory: Negative.     Cardiovascular: Negative.    Musculoskeletal:  Positive for joint pain and myalgias.   Skin:         Swelling    Neurological:  Positive for weakness. Negative for tingling and sensory change.   Psychiatric/Behavioral: Negative.         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 Appears well-developed and well-nourished.   Cardiovascular: Normal rate.    Pulmonary/Chest: Effort normal. No respiratory distress.   Neurological: Patient is alert and oriented to person, place, and time.   Skin: Skin is warm and dry.   Psychiatric: Normal mood and affect. Behavior is normal.     Right knee: No swelling, deformity, effusion, erythema, ecchymosis or lacerations. Normal range of motion. Tenderness present. No medial joint line, lateral joint line, MCL, LCL or patellar tendon tenderness. Mild patella tenderness to palpation. Able to bear weight, steady gait. Positive Adia test. "     MRI Knee:  1.  Lateral patellar tilt and subluxation along with patella christy suggestion patellofemoral pain syndrome.  There is partial-thickness chondromalacia along the lateral facet of the patella.  There is associated free femoral fat pad impingement.  2.  Patellar tendon/lateral femoral condyle friction syndrome.  3.  No internal derangement.    Assessment/Plan:       1. Strain of right knee, subsequent encounter    2. Instability of right knee joint    Released to Restricted Duty FROM 1/25/2024 TO 3/7/2024       Follow-up at 6 weeks, unless seen by orthopedics  Restricted duty, per orthopedics  Orthopedic pending scheduling by , transfer care  MRI completing  Physical therapy appointments as scheduled  Recommend continue with OTC ibuprofen, ice, elevation, and knee brace most of the day  Recommend gentle range of motion and stretching exercises as tolerated      Differential diagnosis, natural history, supportive care, and indications for immediate follow-up discussed.    Approximately 25 minutes was spent in preparing for visit, obtaining history, exam and evaluation, patient counseling/education and post visit documentation/orders.

## 2024-03-07 ENCOUNTER — OCCUPATIONAL MEDICINE (OUTPATIENT)
Dept: OCCUPATIONAL MEDICINE | Facility: CLINIC | Age: 24
End: 2024-03-07
Payer: COMMERCIAL

## 2024-03-07 VITALS
RESPIRATION RATE: 16 BRPM | TEMPERATURE: 97.6 F | OXYGEN SATURATION: 99 % | BODY MASS INDEX: 32.95 KG/M2 | HEART RATE: 56 BPM | SYSTOLIC BLOOD PRESSURE: 128 MMHG | DIASTOLIC BLOOD PRESSURE: 84 MMHG | WEIGHT: 205 LBS | HEIGHT: 66 IN

## 2024-03-07 DIAGNOSIS — M22.2X1 PATELLOFEMORAL PAIN SYNDROME OF RIGHT KNEE: ICD-10-CM

## 2024-03-07 DIAGNOSIS — M25.361 INSTABILITY OF RIGHT KNEE JOINT: ICD-10-CM

## 2024-03-07 DIAGNOSIS — S86.911D STRAIN OF RIGHT KNEE, SUBSEQUENT ENCOUNTER: ICD-10-CM

## 2024-03-07 PROCEDURE — 99213 OFFICE O/P EST LOW 20 MIN: CPT | Performed by: NURSE PRACTITIONER

## 2024-03-07 PROCEDURE — 3074F SYST BP LT 130 MM HG: CPT | Performed by: NURSE PRACTITIONER

## 2024-03-07 PROCEDURE — 3079F DIAST BP 80-89 MM HG: CPT | Performed by: NURSE PRACTITIONER

## 2024-03-07 PROCEDURE — 1125F AMNT PAIN NOTED PAIN PRSNT: CPT | Performed by: NURSE PRACTITIONER

## 2024-03-07 ASSESSMENT — PAIN SCALES - GENERAL: PAINLEVEL: 3=SLIGHT PAIN

## 2024-03-07 ASSESSMENT — ENCOUNTER SYMPTOMS
MYALGIAS: 1
CONSTITUTIONAL NEGATIVE: 1
WEAKNESS: 1
RESPIRATORY NEGATIVE: 1
PSYCHIATRIC NEGATIVE: 1
CARDIOVASCULAR NEGATIVE: 1
TINGLING: 0
SENSORY CHANGE: 0

## 2024-03-07 NOTE — PROGRESS NOTES
"Subjective:     Jessica Luis is a 23 y.o. female who presents for Follow-Up (DOI: 8/2/2023 - RIGHT KNEE - worse - RM  16/)      DOI: 8/2/2023. Patient states she slipped while e-fishing, and came down directly on her right knee. Today patient states that the symptoms remain unchanged, maybe a little worse. Pain to knee cap area, radiates through joint. Continued intermittent knee buckling. She has been taking Ibuprofen, icy-hot and wearing a knee brace with minimal relief.  She is attending physical therapy once a week and they are applying KT tape which she does feel helps, but the symptoms do return.  She has been able to tolerate light duty.  Patient has an appointment with Orthopedics tomorrow. Plan of care discussed with patient.     Review of Systems   Constitutional: Negative.    Respiratory: Negative.     Cardiovascular: Negative.    Musculoskeletal:  Positive for joint pain and myalgias.   Skin: Negative.    Neurological:  Positive for weakness. Negative for tingling and sensory change.   Psychiatric/Behavioral: Negative.         SOCHX: Works as  II at [employer]  FH: No pertinent family history to this problem       Objective:     /84   Pulse (!) 56   Temp 36.4 °C (97.6 °F) (Temporal)   Resp 16   Ht 1.676 m (5' 6\")   Wt 93 kg (205 lb)   SpO2 99%   BMI 33.09 kg/m²     Constitutional: Patient is in no acute distress. Appears well-developed and well-nourished.   Cardiovascular: Normal rate.    Pulmonary/Chest: Effort normal. No respiratory distress.   Neurological: Patient is alert and oriented to person, place, and time.   Skin: Skin is warm and dry.   Psychiatric: Normal mood and affect. Behavior is normal.     Right knee: No swelling, deformity, effusion, erythema, ecchymosis or lacerations. Normal range of motion. Tenderness present. No medial joint line, lateral joint line, MCL, LCL or patellar tendon tenderness. Mild patella tenderness " to palpation. Able to bear weight, steady gait. Positive Adia test.      MRI Knee:  1.  Lateral patellar tilt and subluxation along with patella christy suggestion patellofemoral pain syndrome.  There is partial-thickness chondromalacia along the lateral facet of the patella.  There is associated free femoral fat pad impingement.  2.  Patellar tendon/lateral femoral condyle friction syndrome.  3.  No internal derangement.      Assessment/Plan:       1. Strain of right knee, subsequent encounter    2. Instability of right knee joint    3. Patellofemoral pain syndrome of right knee    Released to Restricted Duty FROM 3/7/2024 TO         Follow-up at 4 weeks, unless seen by orthopedics  Restricted duty, per orthopedics  Orthopedic 3/8/24, transfer care  MRI completing  Physical therapy appointments as scheduled  Recommend continue with OTC ibuprofen, ice, elevation, and knee brace most of the day  Recommend gentle range of motion and stretching exercises as tolerated      Differential diagnosis, natural history, supportive care, and indications for immediate follow-up discussed.    Approximately 25 minutes was spent in preparing for visit, obtaining history, exam and evaluation, patient counseling/education and post visit documentation/orders.

## 2024-03-07 NOTE — LETTER
[address],   [address] [place] [address]  Phone:  [phone] - Fax:  [phone]   [facility] Progress Report and Disability Certification  Date of Service: 3/7/2024   No Show:  No  Date / Time of Next Visit:  Discharged/Care Transfer to Orthopedics 3/8/24   Claim Information   Patient Name: Jessica Luis  Claim Number:     Employer:   [employer]  Date of Injury: 8/2/2023     Insurer / TPA: [employer] [place]  ID / SSN:     Occupation: Tech  Diagnosis: Diagnoses of Strain of right knee, subsequent encounter, Instability of right knee joint, and Patellofemoral pain syndrome of right knee were pertinent to this visit.    Medical Information   Related to Industrial Injury? Yes    Subjective Complaints:  DOI: 8/2/2023. Patient states she slipped while e-fishing, and came down directly on her right knee. Today patient states that the symptoms remain unchanged, maybe a little worse. Pain to knee cap area, radiates through joint. Continued intermittent knee buckling. She has been taking Ibuprofen, icy-hot and wearing a knee brace with minimal relief.  She is attending physical therapy once a week and they are applying KT tape which she does feel helps, but the symptoms do return.  She has been able to tolerate light duty.  Patient has an appointment with Orthopedics tomorrow. Plan of care discussed with patient.    Objective Findings: Right knee: No swelling, deformity, effusion, erythema, ecchymosis or lacerations. Normal range of motion. Tenderness present. No medial joint line, lateral joint line, MCL, LCL or patellar tendon tenderness. Mild patella tenderness to palpation. Able to bear weight, steady gait. Positive Adia test.      MRI Knee:  1.  Lateral patellar tilt and subluxation along with patella christy suggestion patellofemoral pain syndrome.  There is partial-thickness chondromalacia along the  lateral facet of the patella.  There is associated free femoral fat pad impingement.  2.  Patellar tendon/lateral femoral condyle friction syndrome.  3.  No internal derangement.     Pre-Existing Condition(s):     Assessment:   Condition Worsened    Status: Discharged / Care Transfer  Permanent Disability:No    Plan: PTTransfer Care    Diagnostics:      Comments:  Follow-up at 4 weeks, unless seen by orthopedics  Restricted duty, per orthopedics  Orthopedic 3/8/24, transfer care  MRI completing  Physical therapy appointments as scheduled  Recommend continue with OTC ibuprofen, ice, elevation, and knee brace most of the day  Recommend gentle range of motion and stretching exercises as tolerated      Disability Information   Status: Released to Restricted Duty    From:  3/7/2024  Through:   Restrictions are: Temporary   Physical Restrictions   Sitting:    Standing:    Stooping:    Bending:    Comments:Avoid positions and activities that place excessive pressure on the knee joint: Such activities include: squatting, kneeling, twisting and pivoting, repetitive bending of right knee (eg, stairs   Squatting:  < or = to 2 hrs/day Walking:    Climbing:  < or = to 2 hrs/day Pushing:      Pulling:    Other:    Reaching Above Shoulder (L):   Reaching Above Shoulder (R):       Reaching Below Shoulder (L):    Reaching Below Shoulder (R):      Not to exceed Weight Limits   Carrying(hrs):   Weight Limit(lb): < or = to 25 pounds Lifting(hrs):   Weight  Limit(lb): < or = to 25 pounds   Comments:      Repetitive Actions   Hands: i.e. Fine Manipulations from Grasping:     Feet: i.e. Operating Foot Controls:     Driving / Operate Machinery:     Health Care Provider’s Original or Electronic Signature  SHAMIKA Celis Health Care Provider’s Original or Electronic Signature    Fer Bangura DO MPH     Clinic Name / Location: [address],   [address]  [place] [address] Clinic Phone Number: Dept:  [phone]   Appointment Time: 7:45 Am Visit Start Time: 7:40 AM   Check-In Time:  7:37 Am Visit Discharge Time:  7:53 AM    Original-Treating Physician or Chiropractor    Page 2-Insurer/TPA    Page 3-Employer    Page 4-Employee

## 2025-01-17 ENCOUNTER — OFFICE VISIT (OUTPATIENT)
Dept: MEDICAL GROUP | Facility: LAB | Age: 25
End: 2025-01-17
Payer: OTHER GOVERNMENT

## 2025-01-17 VITALS
HEART RATE: 63 BPM | RESPIRATION RATE: 12 BRPM | SYSTOLIC BLOOD PRESSURE: 92 MMHG | DIASTOLIC BLOOD PRESSURE: 50 MMHG | OXYGEN SATURATION: 97 % | WEIGHT: 222.4 LBS | TEMPERATURE: 98.1 F | BODY MASS INDEX: 35.74 KG/M2 | HEIGHT: 66 IN

## 2025-01-17 DIAGNOSIS — M54.6 CHRONIC BILATERAL THORACIC BACK PAIN: ICD-10-CM

## 2025-01-17 DIAGNOSIS — J45.20 MILD INTERMITTENT ASTHMA WITHOUT COMPLICATION: ICD-10-CM

## 2025-01-17 DIAGNOSIS — L85.3 DRY SKIN DERMATITIS: ICD-10-CM

## 2025-01-17 DIAGNOSIS — R06.83 SNORING: ICD-10-CM

## 2025-01-17 DIAGNOSIS — G89.29 CHRONIC BILATERAL THORACIC BACK PAIN: ICD-10-CM

## 2025-01-17 DIAGNOSIS — Z00.00 WELL ADULT EXAM: ICD-10-CM

## 2025-01-17 PROCEDURE — 3074F SYST BP LT 130 MM HG: CPT | Performed by: FAMILY MEDICINE

## 2025-01-17 PROCEDURE — 3078F DIAST BP <80 MM HG: CPT | Performed by: FAMILY MEDICINE

## 2025-01-17 PROCEDURE — 99204 OFFICE O/P NEW MOD 45 MIN: CPT | Performed by: FAMILY MEDICINE

## 2025-01-17 RX ORDER — ALBUTEROL SULFATE 90 UG/1
2 INHALANT RESPIRATORY (INHALATION) EVERY 4 HOURS PRN
Qty: 8 G | Refills: 3 | Status: SHIPPED | OUTPATIENT
Start: 2025-01-17

## 2025-01-17 ASSESSMENT — PATIENT HEALTH QUESTIONNAIRE - PHQ9: CLINICAL INTERPRETATION OF PHQ2 SCORE: 0

## 2025-01-17 NOTE — PROGRESS NOTES
Jessica Luis is a 24 y.o. female here to establish care and discuss multiple concerns.    Has situational hot flashes that are triggered by exertion or stress.  Will get small bumps on the skin with dry skin that are itchy at times, not present currently.  This will happen to arms/hands or legs.  Reports chronic snoring and fatigue.  Has issues where cold air exercise will cause some shortness of breath, no prior asthma diagnosis.      Has had bilateral lower require back pain for the last 15 years or so.  Did see Ortho for this last month with clear thoracic x-ray and supportive measures recommended.  No radiation to the legs.  No numbness or tingling, no weakness.  No fevers.  No bowel or bladder incontinence.      STOPBANG - Sleep Apnea Screening      Flowsheet Row Most Recent Value   S - Have you been told that you SNORE? Yes   T - Are you often TIRED during the day? Yes   O - Do you know if you stop breathing or has anyone witnessed you stop breathing while you were asleep? (OBSTRUCTION) No   P - Do you have high blood PRESSURE or on medication to control high blood pressure? No   B - Is your Body Mass Index greater than 35? (BMI) Yes   A - Are you 50 years old or older? (AGE) No   N - Are you a male with a NECK circumference greater than 17 inches, or a female with a neck circumference greater than 16 inches? Yes   G - Are you male? (GENDER) No   STOPBANG Total Score 4   BRET Risk Intermediate Risk                Current medicines:  No current outpatient medications on file.     No current facility-administered medications for this visit.     She  has no past medical history on file.  She  has a past surgical history that includes foot surgery (Right).  Social History     Tobacco Use    Smoking status: Never    Smokeless tobacco: Never   Vaping Use    Vaping status: Never Used   Substance Use Topics    Alcohol use: Yes     Alcohol/week: 1.8 oz     Types: 3 Standard drinks or equivalent per week     "Drug use: Yes     Comment: thc gummies     Social History     Social History Narrative    Not on file     Family History   Family history unknown: Yes     No family status information on file.       ROS  See HPI     Objective:     Physical Exam:  BP 92/50 (BP Location: Right arm, Patient Position: Sitting, BP Cuff Size: Adult)   Pulse 63   Temp 36.7 °C (98.1 °F) (Temporal)   Resp 12   Ht 1.676 m (5' 6\")   Wt 101 kg (222 lb 6.4 oz)   SpO2 97%  Body mass index is 35.9 kg/m².  Constitutional: Alert. Well appearing. No distress.  Skin: Warm, dry, good turgor, no visible rashes.  ENMT: Moist mucous membranes. Normal dentition.  Airway is small, soft palate extends inferiorly.  Neck: Trachea midline, no masses, no thyromegaly.  Respiratory: Normal effort. Lungs are clear to auscultation bilaterally.  Cardiovascular: Regular rate and rhythm. Normal S1/S2. No murmurs, rubs or gallops.   MSK: Mild midline thoracic tenderness, no paraspinal tenderness.  Neuro: Moves all four extremities. No facial droop.  Psych: Answers questions appropriately. Normal affect and mood.    Assessment and Plan:     1. Dry skin dermatitis  Reports recurrent instances of dry, itchy bumps on skin.  Suspect eczema or dry skin dermatitis.  Discussed moisturizer.    2. Snoring  Snoring, fatigue.  STOP-BANG score of 4.  Sleep study ordered.  - Overnight Home Sleep Study; Future    3. Mild intermittent asthma without complication  Mild symptoms triggered by cold air exercise.  Try albuterol as needed prior to exercise or cold exposure.  Recheck in 3 months.  - albuterol 108 (90 Base) MCG/ACT Aero Soln inhalation aerosol; Inhale 2 Puffs every four hours as needed for Shortness of Breath.  Dispense: 8 g; Refill: 3    4. Chronic bilateral thoracic back pain  Chronic bilateral thoracic and upper lumbar pain for many years.  Orthopedics with clear thoracic x-ray and supportive measures recommended but she has not been improving.  Referral to PT.  - " Referral to Physical Therapy    5. Well adult exam  Labs as below, declines vaccines today.  Would like referral to OB/GYN for Pap  - CBC WITH DIFFERENTIAL; Future  - Comp Metabolic Panel; Future  - Lipid Profile; Future  - TSH WITH REFLEX TO FT4; Future  - HEMOGLOBIN A1C; Future  - VITAMIN D,25 HYDROXY (DEFICIENCY); Future  - Referral to OB/Gyn    Follow up: No follow-ups on file.         PLEASE NOTE: This note was created using voice recognition software.

## 2025-02-11 ENCOUNTER — TELEPHONE (OUTPATIENT)
Dept: HEALTH INFORMATION MANAGEMENT | Facility: OTHER | Age: 25
End: 2025-02-11
Payer: OTHER GOVERNMENT

## 2025-02-13 ENCOUNTER — TELEPHONE (OUTPATIENT)
Dept: HEALTH INFORMATION MANAGEMENT | Facility: OTHER | Age: 25
End: 2025-02-13
Payer: OTHER GOVERNMENT

## 2025-02-19 ENCOUNTER — HOSPITAL ENCOUNTER (OUTPATIENT)
Facility: MEDICAL CENTER | Age: 25
End: 2025-02-19
Payer: OTHER GOVERNMENT

## 2025-02-19 ENCOUNTER — OFFICE VISIT (OUTPATIENT)
Dept: OBGYN | Facility: CLINIC | Age: 25
End: 2025-02-19
Payer: OTHER GOVERNMENT

## 2025-02-19 VITALS
BODY MASS INDEX: 35.52 KG/M2 | DIASTOLIC BLOOD PRESSURE: 74 MMHG | WEIGHT: 221 LBS | HEIGHT: 66 IN | SYSTOLIC BLOOD PRESSURE: 133 MMHG

## 2025-02-19 DIAGNOSIS — Z02.82 ADOPTED: ICD-10-CM

## 2025-02-19 DIAGNOSIS — R00.2 PALPITATIONS: ICD-10-CM

## 2025-02-19 DIAGNOSIS — N92.1 MENORRHAGIA WITH IRREGULAR CYCLE: ICD-10-CM

## 2025-02-19 DIAGNOSIS — Z01.419 WOMEN'S ANNUAL ROUTINE GYNECOLOGICAL EXAMINATION: ICD-10-CM

## 2025-02-19 PROCEDURE — 87491 CHLMYD TRACH DNA AMP PROBE: CPT

## 2025-02-19 PROCEDURE — 87591 N.GONORRHOEAE DNA AMP PROB: CPT

## 2025-02-19 PROCEDURE — 88142 CYTOPATH C/V THIN LAYER: CPT

## 2025-02-19 RX ORDER — MELOXICAM 15 MG/1
1 TABLET ORAL
COMMUNITY

## 2025-02-19 NOTE — PROGRESS NOTES
Patient here for Gyn as new patient  LMP:2/18/2025  Last Pap:Never  BCM:Condoms  Mammogram:n/a  Phone number/Pharmacy verified  Pt states:

## 2025-02-19 NOTE — PROGRESS NOTES
ANNUAL GYNECOLOGY VISIT    Chief Complaint  Annual    Subjective  Ning Luis is a 24 y.o. female  Patient's last menstrual period was 2025 (exact date). She is using condoms for contraception who presents today for Annual Exam.      Preventive Care   Immunization History   Administered Date(s) Administered    9VHPV VACCINE 2-3 DOSE IM (GARDASIL 9) 2016, 2016, 2017    Dtap Vaccine 10/20/2000, 2001, 2001, 10/04/2001, 2003    Hepatitis A Vaccine, Adult 2002, 2003    Hepatitis B Vaccine Adolescent/Pediatric 10/20/2000, 2001, 2001    Hib Vaccine (Prp-d) - HISTORICAL DATA 10/20/2000, 2001, 2001, 10/04/2001    IPV 10/20/2000, 2001, 2001, 2003    Influenza Seasonal Injectable - Historical Data 10/14/2011, 01/10/2014    Influenza Vaccine H1N1 - HISTORICAL DATA 2009    Influenza Vaccine Pediatric Split - Historical Data 2009    Influenza split virus trivalent (PF) 10/26/2012, 10/27/2014, 2016    MMR Vaccine 10/20/2000, 2003    Meningococcal Conjugate Vaccine MCV4 (MENVEO) 2016    Meningococcal Conjugate Vaccine MCV4 (Menactra) 2012    PFIZER PURPLE CAP SARS-COV-2 VACCINATION (12+) 09/10/2021, 10/01/2021    Tdap Vaccine 2012    Tuberculin Skin Test 2018    Varicella Vaccine Live 2001, 2009       Guardasil HPV vaccine: Completed 3 dose series    Gynecology History and ROS  Current Sexual Activity: yes - one partner, male   History of sexually transmitted diseases? no  Abnormal discharge? no  Current Contraception:  Condoms     Menstrual History  Patient's last menstrual period was 2025 (exact date).  Periods are irregular q 28 days to 6 months, lasting 3 days to 4 weeks.   - Periods have been irregular since she was 17 years old.   - Does have increased hair growth on chest/abdomen  Clots or heavy flow: yes - in the middle of menses, will bleed  heavy for a full week. She has to change overnight pad every 2 - 3 hours.   Dysmenorrhea: no  Intermenstrual bleeding/spotting: no  Significant pain with periods:no  Bothersome PMS symptoms: yes - rarely will have breasts be tender  Significant Pelvic Pain: no    Pap History  Last pap smear: never has had    Cancer Risk Assessement:  Family history of:  - unknown, adopted     Obstetric History  OB History    Para Term  AB Living   0 0 0 0 0 0   SAB IAB Ectopic Molar Multiple Live Births   0 0 0 0 0 0       Past Medical History  History reviewed. No pertinent past medical history.    Past Surgical History  Past Surgical History:   Procedure Laterality Date    DENTAL EXTRACTION(S)      FOOT SURGERY Right     shaving of bone       Social History  Social History     Tobacco Use    Smoking status: Never    Smokeless tobacco: Never   Vaping Use    Vaping status: Never Used   Substance Use Topics    Alcohol use: Yes     Alcohol/week: 1.8 oz     Types: 3 Standard drinks or equivalent per week    Drug use: Yes     Types: Marijuana     Comment: thc gummies        Family History  Family History   Family history unknown: Yes       Home Medications  Current Outpatient Medications   Medication Sig    albuterol 108 (90 Base) MCG/ACT Aero Soln inhalation aerosol Inhale 2 Puffs every four hours as needed for Shortness of Breath.    meloxicam (MOBIC) 15 MG tablet Take 1 Tablet by mouth every day. (Patient not taking: Reported on 2025)       Allergies/Reactions  No Known Allergies    ROS  Positive ROS: See HPI   Gen: no fevers or chills, no significant weight loss or gain, excessive fatigue  Respiratory:  no cough or dyspnea  Cardiac:  Feels chest pain and palpitations once per week. This will last about 30 seconds at a time. Feels not related to anxiety. Drinks caffeine maybe once a year, but gets worse if she drinks caffeine. Working with PCP to manage. No syncope  Breast: no breast discharge, pain, lump or skin  "changes  GI:  no heartburn, no abdominal pain, no nausea or vomiting  Urinary: no dysuria, urgency, frequency, incontinence   Psych: hx of anxiety. No depression  Neuro: no migraines with aura, fainting spells, numbness or tingling  Extremities: no joint pain, persistently swollen ankles, recurrent leg cramps      Physical Examination:  Vital Signs: /74 (BP Location: Right arm, Patient Position: Sitting, BP Cuff Size: Adult)   Ht 5' 6\"   Wt 221 lb   LMP 02/18/2025 (Exact Date)   BMI 35.67 kg/m²       Constitutional: The patient is well developed and well nourished.  Psychiatric: Patient is oriented to time place and person.   Skin: No rash observed.  Neck: Appears symmetric. Thyroid normal size  Respiratory: normal effort  Breast: Inspection reveals no asymetry or nipple discharge, no skin thickening, dimpling or erythema.  Palpation demonstrates no masses.  Abdomen: Soft, non-tender.  Pelvic Exam:      Vulva: external female genitalia are normal in appearance. No lesions     Urethra - no lesions, no erythema     Vagina: moist, pink, normal ruggae     Cervix: pink, smooth, no lesions, no CMT     Uterus - non-tender, normal size, shape, contour, mobile, anteverted     Ovaries: non-tender, no appreciable masses    Pap Smear performed: Yes    Chaperone Present: Ethan Ca MA  Extremeties: Legs are symmetric and without tenderness. There is no edema present.        Assessment & Plan  Ning Luis is a 24 y.o. female who presents today for Annual Gyn Exam.     Assessment & Plan  Women's annual routine gynecological examination  Anticipatory guidance given. Encouraged adequate water intake, healthy diet, regular exercise. Educated on Pap smear screening and guidelines for age per ACOG and ASSCP. Discussed safe sex, STI prevention, contraception/family planning. Self breast exam taught.   Orders:    THINPREP RFLX HPV ASCUS W/CTNG; Future    Menorrhagia with irregular cycle  Patient most likely " has PCOS due to irregular periods and increased hair growth on chest and abdomen. Will order the following labs and pelvic US to confirm:   Orders:    17-OH PROGESTERONE; Future    CBC WITH DIFFERENTIAL; Future    ESTRADIOL; Future    FSH/LH; Future    PROGESTERONE; Future    PROLACTIN; Future    TESTOSTERONE F&T FEMALES/CHILD; Future    TSH WITH REFLEX TO FT4; Future    US-PELVIC COMPLETE (TRANSABDOMINAL/TRANSVAGINAL) (COMBO); Future    Adopted  Discussed genetic testing with [facility] due to patient being adopted. Patient is interested and I placed the referral.   Orders:    Referral to Genetic Research Studies    Palpitations  Discussed palpitations and chest pain with patient. Encouraged patient to go to the ED if severe chest pain, dyspnea, headaches with vision changes, or arm numbness/tingling develops. Her BP was within normal limits today. Highly encouraged her to schedule a visit with her PCP again or send a [employer] message to her PCP.             Return: Annually or PRN    SAUL Diaz.  [facility]

## 2025-02-20 ENCOUNTER — RESEARCH ENCOUNTER (OUTPATIENT)
Dept: RESEARCH | Facility: MEDICAL CENTER | Age: 25
End: 2025-02-20
Payer: OTHER GOVERNMENT

## 2025-02-20 DIAGNOSIS — Z01.419 WOMEN'S ANNUAL ROUTINE GYNECOLOGICAL EXAMINATION: ICD-10-CM

## 2025-02-20 LAB
C TRACH DNA GENITAL QL NAA+PROBE: NEGATIVE
N GONORRHOEA DNA GENITAL QL NAA+PROBE: NEGATIVE
SPECIMEN SOURCE: NORMAL

## 2025-02-20 NOTE — RESEARCH NOTE
Patient has been referred by SAUL Diaz. The initial referral follow-up message, which includes the consent form link, has been sent to the patient.    Eligible Studies: HNP

## 2025-02-26 ENCOUNTER — RESULTS FOLLOW-UP (OUTPATIENT)
Dept: OBGYN | Facility: CLINIC | Age: 25
End: 2025-02-26
Payer: OTHER GOVERNMENT

## 2025-02-26 LAB — THINPREP PAP, CYTOLOGY NL11781: NORMAL

## 2025-02-27 ENCOUNTER — HOME STUDY (OUTPATIENT)
Dept: SLEEP MEDICINE | Facility: MEDICAL CENTER | Age: 25
End: 2025-02-27
Attending: FAMILY MEDICINE
Payer: OTHER GOVERNMENT

## 2025-02-27 DIAGNOSIS — R06.83 SNORING: ICD-10-CM

## 2025-02-27 PROCEDURE — 95800 SLP STDY UNATTENDED: CPT | Performed by: STUDENT IN AN ORGANIZED HEALTH CARE EDUCATION/TRAINING PROGRAM

## 2025-03-03 DIAGNOSIS — Z00.6 CLINICAL TRIAL PARTICIPANT: ICD-10-CM

## 2025-03-05 ENCOUNTER — RESULTS FOLLOW-UP (OUTPATIENT)
Dept: MEDICAL GROUP | Facility: LAB | Age: 25
End: 2025-03-05
Payer: OTHER GOVERNMENT

## 2025-03-05 NOTE — PROCEDURES
DIAGNOSTIC HOME SLEEP TEST (HST) REPORT WatchPAT      PATIENT ID:  NAME:  Ning Luis  MRN:               [id number]  YOB: 2000  DATE OF STUDY: 02/27/2025      Impression:     This study shows evidence of:      1. Mild obstructive sleep apnea with 4% PAT apnea hypopnea index(pAHI) of 14.8 per hour.  PAT respiratory disturbance index (pRDI) was 27 per hour. These findings are based on 7 channels recording of PAT signal with sleep staging, heart rate, pulse oximetry, actigraphy, body position, snoring and respiratory movement.     2. Oxygenation O2 Sat. mean O2 sat was 95%,  lily was 80% (artifact),  and maximum O2 at 98 %. O2 sat was at or  below 88% for 0.6 min of evaluation time. Oxygen Desaturation (>=4%) Index was 13.7/hr. AVG HR was 64 BPM.      TECHNICAL DESCRIPTION: Patient underwent home sleep apnea testing with peripheral arterial tone signal (WatchPAT™). This is a Type IV portable monitor and device per Medicare. Monitoring was done with 7 channels recording of PAT signal with sleep staging, heart rate, pulse oximetry, actigraphy, body position, snoring and respiratory movement. Prior to using the device, the patient received verbal and written instructions for its application and was provided with the help desk phone number for additional telephonic instruction with 24-hour availability of qualified personnel to answer questions.    Respiratory events:        General sleep summary: . Total recording time is 9 hours and 35 minutes and total Sleep time is 8 hours and 12 minutes. The patient spent 242 minutes in the supine position and 251 minutes in the nonsupine position.      Recommendations:    1. CPAP titration study vs Auto CPAP trial.  If starting auto CPAP recommend minimum pressure of 4 cmH2O maximum pressure 12 cmH2O.    2. In general patients with sleep apnea are advised to avoid alcohol and sedatives and to not operate a motor vehicle while drowsy. In some cases  alternative treatment options may prove effective in resolving sleep apnea in these options include upper airway surgery, the use of a dental orthotic or weight loss and positional therapy. Clinical correlation is required.

## 2025-03-24 ENCOUNTER — HOSPITAL ENCOUNTER (OUTPATIENT)
Dept: LAB | Facility: MEDICAL CENTER | Age: 25
End: 2025-03-24
Attending: FAMILY MEDICINE
Payer: OTHER GOVERNMENT

## 2025-03-24 ENCOUNTER — HOSPITAL ENCOUNTER (OUTPATIENT)
Dept: LAB | Facility: MEDICAL CENTER | Age: 25
End: 2025-03-24
Attending: FAMILY MEDICINE

## 2025-03-24 DIAGNOSIS — Z00.00 WELL ADULT EXAM: ICD-10-CM

## 2025-03-24 DIAGNOSIS — Z00.6 CLINICAL TRIAL PARTICIPANT: ICD-10-CM

## 2025-03-24 DIAGNOSIS — N92.1 MENORRHAGIA WITH IRREGULAR CYCLE: ICD-10-CM

## 2025-03-24 LAB
25(OH)D3 SERPL-MCNC: 17 NG/ML (ref 30–100)
ALBUMIN SERPL BCP-MCNC: 4.3 G/DL (ref 3.2–4.9)
ALBUMIN/GLOB SERPL: 1.4 G/DL
ALP SERPL-CCNC: 82 U/L (ref 30–99)
ALT SERPL-CCNC: 77 U/L (ref 2–50)
ANION GAP SERPL CALC-SCNC: 11 MMOL/L (ref 7–16)
AST SERPL-CCNC: 40 U/L (ref 12–45)
BASOPHILS # BLD AUTO: 0.3 % (ref 0–1.8)
BASOPHILS # BLD AUTO: 0.3 % (ref 0–1.8)
BASOPHILS # BLD: 0.02 K/UL (ref 0–0.12)
BASOPHILS # BLD: 0.02 K/UL (ref 0–0.12)
BILIRUB SERPL-MCNC: 0.6 MG/DL (ref 0.1–1.5)
BUN SERPL-MCNC: 15 MG/DL (ref 8–22)
CALCIUM ALBUM COR SERPL-MCNC: 9.4 MG/DL (ref 8.5–10.5)
CALCIUM SERPL-MCNC: 9.6 MG/DL (ref 8.5–10.5)
CHLORIDE SERPL-SCNC: 106 MMOL/L (ref 96–112)
CHOLEST SERPL-MCNC: 170 MG/DL (ref 100–199)
CO2 SERPL-SCNC: 24 MMOL/L (ref 20–33)
CREAT SERPL-MCNC: 0.82 MG/DL (ref 0.5–1.4)
EOSINOPHIL # BLD AUTO: 0.07 K/UL (ref 0–0.51)
EOSINOPHIL # BLD AUTO: 0.09 K/UL (ref 0–0.51)
EOSINOPHIL NFR BLD: 1 % (ref 0–6.9)
EOSINOPHIL NFR BLD: 1.3 % (ref 0–6.9)
ERYTHROCYTE [DISTWIDTH] IN BLOOD BY AUTOMATED COUNT: 45 FL (ref 35.9–50)
ERYTHROCYTE [DISTWIDTH] IN BLOOD BY AUTOMATED COUNT: 47 FL (ref 35.9–50)
EST. AVERAGE GLUCOSE BLD GHB EST-MCNC: 134 MG/DL
ESTRADIOL SERPL-MCNC: 47.9 PG/ML
FASTING STATUS PATIENT QL REPORTED: NORMAL
FSH SERPL-ACNC: 5.5 MIU/ML
GFR SERPLBLD CREATININE-BSD FMLA CKD-EPI: 101 ML/MIN/1.73 M 2
GLOBULIN SER CALC-MCNC: 3.1 G/DL (ref 1.9–3.5)
GLUCOSE SERPL-MCNC: 115 MG/DL (ref 65–99)
HBA1C MFR BLD: 6.3 % (ref 4–5.6)
HCT VFR BLD AUTO: 41.5 % (ref 37–47)
HCT VFR BLD AUTO: 43.1 % (ref 37–47)
HDLC SERPL-MCNC: 41 MG/DL
HGB BLD-MCNC: 12.5 G/DL (ref 12–16)
HGB BLD-MCNC: 13 G/DL (ref 12–16)
IMM GRANULOCYTES # BLD AUTO: 0.02 K/UL (ref 0–0.11)
IMM GRANULOCYTES # BLD AUTO: 0.02 K/UL (ref 0–0.11)
IMM GRANULOCYTES NFR BLD AUTO: 0.3 % (ref 0–0.9)
IMM GRANULOCYTES NFR BLD AUTO: 0.3 % (ref 0–0.9)
LDLC SERPL CALC-MCNC: 115 MG/DL
LH SERPL-ACNC: 11.4 IU/L
LYMPHOCYTES # BLD AUTO: 2.45 K/UL (ref 1–4.8)
LYMPHOCYTES # BLD AUTO: 2.5 K/UL (ref 1–4.8)
LYMPHOCYTES NFR BLD: 35.3 % (ref 22–41)
LYMPHOCYTES NFR BLD: 36.7 % (ref 22–41)
MCH RBC QN AUTO: 22.4 PG (ref 27–33)
MCH RBC QN AUTO: 23 PG (ref 27–33)
MCHC RBC AUTO-ENTMCNC: 30.1 G/DL (ref 32.2–35.5)
MCHC RBC AUTO-ENTMCNC: 30.2 G/DL (ref 32.2–35.5)
MCV RBC AUTO: 74.4 FL (ref 81.4–97.8)
MCV RBC AUTO: 76.3 FL (ref 81.4–97.8)
MONOCYTES # BLD AUTO: 0.34 K/UL (ref 0–0.85)
MONOCYTES # BLD AUTO: 0.38 K/UL (ref 0–0.85)
MONOCYTES NFR BLD AUTO: 5 % (ref 0–13.4)
MONOCYTES NFR BLD AUTO: 5.5 % (ref 0–13.4)
NEUTROPHILS # BLD AUTO: 3.87 K/UL (ref 1.82–7.42)
NEUTROPHILS # BLD AUTO: 3.99 K/UL (ref 1.82–7.42)
NEUTROPHILS NFR BLD: 56.7 % (ref 44–72)
NEUTROPHILS NFR BLD: 57.3 % (ref 44–72)
NRBC # BLD AUTO: 0 K/UL
NRBC # BLD AUTO: 0 K/UL
NRBC BLD-RTO: 0 /100 WBC (ref 0–0.2)
NRBC BLD-RTO: 0 /100 WBC (ref 0–0.2)
PLATELET # BLD AUTO: 301 K/UL (ref 164–446)
PLATELET # BLD AUTO: 338 K/UL (ref 164–446)
PMV BLD AUTO: 10.7 FL (ref 9–12.9)
PMV BLD AUTO: 11 FL (ref 9–12.9)
POTASSIUM SERPL-SCNC: 4.6 MMOL/L (ref 3.6–5.5)
PROGEST SERPL-MCNC: 0.12 NG/ML
PROLACTIN SERPL-MCNC: 9.17 NG/ML (ref 2.8–26)
PROT SERPL-MCNC: 7.4 G/DL (ref 6–8.2)
RBC # BLD AUTO: 5.58 M/UL (ref 4.2–5.4)
RBC # BLD AUTO: 5.65 M/UL (ref 4.2–5.4)
SODIUM SERPL-SCNC: 141 MMOL/L (ref 135–145)
TRIGL SERPL-MCNC: 71 MG/DL (ref 0–149)
TSH SERPL DL<=0.005 MIU/L-ACNC: 1.22 UIU/ML (ref 0.38–5.33)
TSH SERPL DL<=0.005 MIU/L-ACNC: 1.23 UIU/ML (ref 0.38–5.33)
WBC # BLD AUTO: 6.8 K/UL (ref 4.8–10.8)
WBC # BLD AUTO: 7 K/UL (ref 4.8–10.8)

## 2025-03-24 PROCEDURE — 85025 COMPLETE CBC W/AUTO DIFF WBC: CPT

## 2025-03-24 PROCEDURE — 83498 ASY HYDROXYPROGESTERONE 17-D: CPT

## 2025-03-24 PROCEDURE — 83001 ASSAY OF GONADOTROPIN (FSH): CPT

## 2025-03-24 PROCEDURE — 80053 COMPREHEN METABOLIC PANEL: CPT

## 2025-03-24 PROCEDURE — 36415 COLL VENOUS BLD VENIPUNCTURE: CPT

## 2025-03-24 PROCEDURE — 83002 ASSAY OF GONADOTROPIN (LH): CPT

## 2025-03-24 PROCEDURE — 84403 ASSAY OF TOTAL TESTOSTERONE: CPT

## 2025-03-24 PROCEDURE — 85025 COMPLETE CBC W/AUTO DIFF WBC: CPT | Mod: 91

## 2025-03-24 PROCEDURE — 82306 VITAMIN D 25 HYDROXY: CPT

## 2025-03-24 PROCEDURE — 84443 ASSAY THYROID STIM HORMONE: CPT | Mod: 91

## 2025-03-24 PROCEDURE — 84144 ASSAY OF PROGESTERONE: CPT

## 2025-03-24 PROCEDURE — 84270 ASSAY OF SEX HORMONE GLOBUL: CPT

## 2025-03-24 PROCEDURE — 84146 ASSAY OF PROLACTIN: CPT

## 2025-03-24 PROCEDURE — 83036 HEMOGLOBIN GLYCOSYLATED A1C: CPT

## 2025-03-24 PROCEDURE — 82670 ASSAY OF TOTAL ESTRADIOL: CPT

## 2025-03-24 PROCEDURE — 84443 ASSAY THYROID STIM HORMONE: CPT

## 2025-03-24 PROCEDURE — 80061 LIPID PANEL: CPT

## 2025-03-24 PROCEDURE — 84402 ASSAY OF FREE TESTOSTERONE: CPT

## 2025-03-25 ENCOUNTER — RESULTS FOLLOW-UP (OUTPATIENT)
Dept: MEDICAL GROUP | Facility: LAB | Age: 25
End: 2025-03-25
Payer: OTHER GOVERNMENT

## 2025-03-26 ENCOUNTER — RESULTS FOLLOW-UP (OUTPATIENT)
Dept: OBGYN | Facility: CLINIC | Age: 25
End: 2025-03-26
Payer: OTHER GOVERNMENT

## 2025-03-26 DIAGNOSIS — N92.1 MENORRHAGIA WITH IRREGULAR CYCLE: ICD-10-CM

## 2025-03-28 LAB
17OHP SERPL-MCNC: 49.69 NG/DL
SHBG SERPL-SCNC: 17 NMOL/L (ref 25–122)
TESTOST FREE SERPL-MCNC: 17.5 PG/ML (ref 0.8–7.4)
TESTOST SERPL-MCNC: 77 NG/DL (ref 9–55)

## 2025-04-08 ENCOUNTER — RESULTS FOLLOW-UP (OUTPATIENT)
Dept: MEDICAL GROUP | Facility: PHYSICIAN GROUP | Age: 25
End: 2025-04-08
Payer: OTHER GOVERNMENT

## 2025-04-08 LAB
APOB+LDLR+PCSK9 GENE MUT ANL BLD/T: NOT DETECTED
BRCA1+BRCA2 DEL+DUP + FULL MUT ANL BLD/T: NOT DETECTED
MLH1+MSH2+MSH6+PMS2 GN DEL+DUP+FUL M: NOT DETECTED

## 2025-04-14 ENCOUNTER — HOSPITAL ENCOUNTER (OUTPATIENT)
Dept: LAB | Facility: MEDICAL CENTER | Age: 25
End: 2025-04-14
Payer: OTHER GOVERNMENT

## 2025-04-14 PROCEDURE — 82728 ASSAY OF FERRITIN: CPT

## 2025-04-14 PROCEDURE — 83540 ASSAY OF IRON: CPT

## 2025-04-14 PROCEDURE — 36415 COLL VENOUS BLD VENIPUNCTURE: CPT

## 2025-04-14 PROCEDURE — 83550 IRON BINDING TEST: CPT

## 2025-04-14 PROCEDURE — 84466 ASSAY OF TRANSFERRIN: CPT

## 2025-04-15 LAB
FERRITIN SERPL-MCNC: 6.5 NG/ML (ref 10–291)
IRON SATN MFR SERPL: 6 % (ref 15–55)
IRON SERPL-MCNC: 23 UG/DL (ref 40–170)
TIBC SERPL-MCNC: 380 UG/DL (ref 250–450)
TRANSFERRIN SERPL-MCNC: 313 MG/DL (ref 200–370)
UIBC SERPL-MCNC: 357 UG/DL (ref 110–370)

## 2025-04-16 ENCOUNTER — RESULTS FOLLOW-UP (OUTPATIENT)
Dept: OBGYN | Facility: CLINIC | Age: 25
End: 2025-04-16
Payer: OTHER GOVERNMENT

## 2025-04-17 ENCOUNTER — OFFICE VISIT (OUTPATIENT)
Dept: MEDICAL GROUP | Facility: LAB | Age: 25
End: 2025-04-17
Payer: OTHER GOVERNMENT

## 2025-04-17 VITALS
OXYGEN SATURATION: 99 % | SYSTOLIC BLOOD PRESSURE: 110 MMHG | DIASTOLIC BLOOD PRESSURE: 70 MMHG | BODY MASS INDEX: 33.75 KG/M2 | WEIGHT: 210 LBS | HEART RATE: 83 BPM | HEIGHT: 66 IN | RESPIRATION RATE: 16 BRPM | TEMPERATURE: 98.5 F

## 2025-04-17 DIAGNOSIS — R73.03 PREDIABETES: ICD-10-CM

## 2025-04-17 DIAGNOSIS — G47.33 OSA (OBSTRUCTIVE SLEEP APNEA): ICD-10-CM

## 2025-04-17 DIAGNOSIS — R74.01 ELEVATED ALT MEASUREMENT: ICD-10-CM

## 2025-04-17 DIAGNOSIS — E61.1 IRON DEFICIENCY: ICD-10-CM

## 2025-04-17 PROCEDURE — 99214 OFFICE O/P EST MOD 30 MIN: CPT | Performed by: FAMILY MEDICINE

## 2025-04-17 PROCEDURE — 3078F DIAST BP <80 MM HG: CPT | Performed by: FAMILY MEDICINE

## 2025-04-17 PROCEDURE — 3074F SYST BP LT 130 MM HG: CPT | Performed by: FAMILY MEDICINE

## 2025-04-17 RX ORDER — METFORMIN HYDROCHLORIDE 750 MG/1
750 TABLET, EXTENDED RELEASE ORAL DAILY
Qty: 100 TABLET | Refills: 3 | Status: SHIPPED | OUTPATIENT
Start: 2025-04-17 | End: 2026-05-22

## 2025-04-17 ASSESSMENT — FIBROSIS 4 INDEX: FIB4 SCORE: 0.34

## 2025-04-17 NOTE — PROGRESS NOTES
"Subjective:     CC: Follow up    HPI:   Ning Gamez presents today to follow-up on multiple issues after visit 3 months ago.  Recent labs notable for iron deficiency, elevated ALT, and prediabetes with A1c 6.3%.  Has also been following with OB/GYN and undergoing workup for heavy menstrual periods with plan pelvic ultrasound.  Sleep study did indicate mild BRET.      Current Outpatient Medications   Medication Sig Dispense Refill    meloxicam (MOBIC) 15 MG tablet Take 1 Tablet by mouth every day. (Patient not taking: Reported on 2/19/2025)      albuterol 108 (90 Base) MCG/ACT Aero Soln inhalation aerosol Inhale 2 Puffs every four hours as needed for Shortness of Breath. 8 g 3     No current facility-administered medications for this visit.       Medications, past medical history, allergies, and social history have been reviewed and updated.      Objective:       Exam:  /70 (BP Location: Left arm, Patient Position: Sitting, BP Cuff Size: Adult)   Pulse 83   Temp 36.9 °C (98.5 °F) (Temporal)   Resp 16   Ht 1.676 m (5' 6\")   Wt 95.3 kg (210 lb)   SpO2 99%   BMI 33.89 kg/m²  Body mass index is 33.89 kg/m².    Constitutional: Alert. Well appearing. No distress.  Skin: Warm, dry, good turgor, no visible rashes.  ENMT: Moist mucous membranes. Normal dentition.  Respiratory: Normal effort.   Neuro: Moves all four extremities. No facial droop.  Psych: Answers questions appropriately. Normal affect and mood.    Assessment & Plan:     24 y.o. female with the following -     1. BRET (obstructive sleep apnea)  Recommended CPAP, she declines at this point but will consider.  Referral placed to sleep medicine.  - Referral to Pulmonary and Sleep Medicine    2. Elevated ALT measurement  Further evaluation with additional labs and right upper quadrant ultrasound.  Did have iron studies showing iron deficiency.  - HEPATIC FUNCTION PANEL; Future  - HEPATITIS PANEL ACUTE(4 COMPONENTS); Future  - US-RUQ; Future    3. Iron " deficiency  Likely secondary to heavy menstrual periods, is following with OB/GYN and has pelvic ultrasound planned.    4. Prediabetes  A1c 6.3%.  Discussed diet and lifestyle measures and will also start metformin.  Recheck 3 months.  - metFORMIN ER (GLUCOPHAGE XR) 750 MG TABLET SR 24 HR; Take 1 Tablet by mouth every day.  Dispense: 100 Tablet; Refill: 3  - HEMOGLOBIN A1C; Future      Please note that this note was created using voice recognition software.

## 2025-05-05 ENCOUNTER — OFFICE VISIT (OUTPATIENT)
Dept: SLEEP MEDICINE | Facility: MEDICAL CENTER | Age: 25
End: 2025-05-05
Payer: OTHER GOVERNMENT

## 2025-05-05 VITALS
BODY MASS INDEX: 34.52 KG/M2 | SYSTOLIC BLOOD PRESSURE: 124 MMHG | HEIGHT: 65 IN | OXYGEN SATURATION: 98 % | DIASTOLIC BLOOD PRESSURE: 72 MMHG | WEIGHT: 207.2 LBS | HEART RATE: 69 BPM | RESPIRATION RATE: 16 BRPM

## 2025-05-05 DIAGNOSIS — G47.33 OSA (OBSTRUCTIVE SLEEP APNEA): ICD-10-CM

## 2025-05-05 PROCEDURE — 99213 OFFICE O/P EST LOW 20 MIN: CPT

## 2025-05-05 PROCEDURE — 99204 OFFICE O/P NEW MOD 45 MIN: CPT

## 2025-05-05 ASSESSMENT — FIBROSIS 4 INDEX: FIB4 SCORE: 0.34

## 2025-05-05 NOTE — PROGRESS NOTES
[facility] Consult Note     Date: 5/5/2025 / Time: 9:04 AM      Thank you for requesting a sleep medicine consultation on Ning Luis at the sleep center. Presents today with the chief complaints of snoring  and daytime sleepiness. She is referred by PCP for evaluation and treatment of sleep disorder breathing.       HISTORY OF PRESENT ILLNESS:     Ning Luis is a 24 y.o. female with history of elevated BMI, never smoker.  Ning Gamez presents to Sleep Clinic for evaluation and treatment of sleep disorder breathing.     Patient reports excessive daytime sleepiness, fatigue, irritability, snoring, night sweats.  Patient reports insomnia with sleep onset latency of 1 to 3 hours as well as 2-5 awakenings throughout the night.  Patient has also had sleep-related behaviors such as sleep talking, recurrent nightmares, punching in her dreams.  Patient has been told since she was a child that she breathes really in her sleep.  The symptoms were reported to her PCP who ordered sleep apnea testing via HST.    Patient was diagnosed with BRET via recent home sleep testing 2/27/25 which showed pAHI 14.8, pRDI 27, mean O2 sat was 95%O2 sat was at or  below 88% for 0.6 min of evaluation time.  Recommendations we made for CPAP titration study vs Auto CPAP trial.      As per supplemental questionnaire to be scanned or imported into chart:    [place] Sleepiness Score: 10    Sleep Schedule  Bedtime: Weekday 9 PM Weekend 10PM  Wake time: Weekday 6AM Weekend 7AM  Sleep-onset latency: 1-3 hours   Awakenings from sleep: 2-5  Difficulty falling back asleep: sometimes  Bedroom partner: no  Naps: no    DAYTIME SYMPTOMS:   Excessive daytime sleepiness: yes  Daytime fatigue: yes  Difficulty concentrating: sometimes  Memory problems: no  Irritability:yes  Work/school performance issues: sometimes  Sleepiness with driving: no  Caffeine/stimulant use: No  Alcohol use:rarely     SLEEP RELATED  "SYMPTOMS  Snoring: yes  Witnessed apnea or gasping/choking: No   Dry mouth or mouth breathing: yes  Sweating: yes  Teeth grinding/biting: yes  Morning headaches: not usually   Refreshed Upon Awakening: no     SLEEP RELATED BEHAVIORS:  Parasomnias (walking, talking, eating, violence): sleep talking   Leg kicking: no  Restless legs - \"urge to move\": no  Nightmares: yes Recurrent: Yes - since a kid   Dream enactment: possibly here or there      NARCOLEPSY:  Cataplexy: feels lightheaded with laughing   Sleep paralysis: No   Sleep attacks: No   Hypnagogic/hypnopompic hallucinations: No     Occupation: [employer], student     MEDICAL HISTORY  History reviewed. No pertinent past medical history.     SURGICAL HISTORY  Past Surgical History:   Procedure Laterality Date    DENTAL EXTRACTION(S)      FOOT SURGERY Right     shaving of bone        FAMILY HISTORY  Family History   Family history unknown: Yes       SOCIAL HISTORY  Social History     Socioeconomic History    Marital status: Single   Tobacco Use    Smoking status: Never    Smokeless tobacco: Never   Vaping Use    Vaping status: Never Used   Substance and Sexual Activity    Alcohol use: Yes     Alcohol/week: 1.8 oz     Types: 3 Standard drinks or equivalent per week     Comment: occ    Drug use: Yes     Types: Marijuana     Comment: thc gummies 1-2 times a week    Sexual activity: Yes     Partners: Male          CURRENT MEDICATIONS  Current Outpatient Medications   Medication Sig    metFORMIN ER (GLUCOPHAGE XR) 750 MG TABLET SR 24 HR Take 1 Tablet by mouth every day.    albuterol 108 (90 Base) MCG/ACT Aero Soln inhalation aerosol Inhale 2 Puffs every four hours as needed for Shortness of Breath.    meloxicam (MOBIC) 15 MG tablet Take 1 Tablet by mouth every day. (Patient not taking: Reported on 5/5/2025)       REVIEW OF SYSTEMS  Constitutional: Denies fevers, Denies weight changes  Ears/Nose/Throat/Mouth: Denies nasal congestion or sore throat   Cardiovascular: Denies " "chest pain  Respiratory: Denies shortness of breath, Denies cough  Gastrointestinal/Hepatic: Denies nausea, vomiting  Sleep: see HPI    Physical Examination:  Vitals/ General Appearance:   Encounter Vitals  Blood Pressure: 124/72  Pulse: 69  Respiration: 16  Pulse Oximetry: 98 %  Weight: 94 kg (207 lb 3.2 oz)  Height: 165.1 cm (5' 5\") (per patient)  BMI (Calculated): 34.48  Pulmonary Vitals  Neck circumference: 16    Pt. is alert and oriented to time, place and person. Cooperative and in no apparent distress.     Constitutional: Alert, no distress, well-groomed.  Skin: No rashes in visible areas.  Eye: Round. Conjunctiva clear, lids normal. No icterus.   ENT EXAM  Nasal septum deviation: No   Nasal turbinate hypertrophy: Left: Grade 1   Right: Grade 1  Nasal erythema: No   Oropharyngeal erythema No   Hard palate narrow: Yes  Hard palate high: Yes  Soft palate/uvula (Mallampati score): 2  Tongue Scalloping: Yes  Retrognathia: No   Micrognathia: No   Cardiovascular:normal S1 and S2 heart sounds, regular rate and rhythm  Pulmonary:Normal breath sounds, Clear to auscultation  Neurologic:Muscle tone normal, Awake, alert and oriented x 3  Extremities: No clubbing, cyanosis, or edema     Bicarb:   Lab Results   Component Value Date/Time    CO2 24 03/24/2025 0939    CO2 25 10/21/2021 0758     TSH:   Lab Results   Component Value Date/Time    TSHULTRASEN 1.230 03/24/2025 0946     CREATININE:   Lab Results   Component Value Date/Time    CREATININE 0.82 03/24/2025 0939     VIT D:   Lab Results   Component Value Date/Time    25HYDROXY 17 (L) 03/24/2025 0939     H/H:  Lab Results   Component Value Date/Time    HEMOGLOBIN 13.0 03/24/2025 09:46 AM           Medical Decision Making   Assessment and Plan  The medical record was reviewed.    Obstructive sleep apnea  - Diagnostic and titration nocturnal polysomnogram's  reviewed. Based on results, it is recommended that patient start autoCPAP. Patient is agreeable.  - [place] " Sleepiness Scale: 10  - Patient reports snoring , daytime sleepiness, fatigue, frequent nighttime awakenings, unrefreshed upon awakening, and insomnia.  - Patient has risk factors for BRET including elevated BMI, thick neck circumference, crowded oropharynx, and tongue scalloping .    - We have also discussed treatment options including airway pressurization, weight management, dental appliances, ENT surgery, Inspire implant, and behavioral modification.   - Patients with BRET are at increased risk of cardiovascular disease including coronary artery disease, systemic arterial hypertension, pulmonary arterial hypertension, cardiac arrythmias, and stroke. Positive airway pressure will favorably impact many of the adverse conditions and effects provoked by BRET. Avoid driving a motor vehicle when drowsy  - Order placed for mask and supplies and new machine   - Clean equipment frequently, and get new mask and supplies as allowed by insurance and DME. Advised to let DME company know in the first 30 days if any issues with mask fit arise so that new mask can be tried.   - Discussed compliance requirements for insurance purposes as well as ultimate clinical goal of wearing and tolerating PAP device nightly for full night of sleep to achieve optimal symptomatic and prophylactic benefit.   - Advised to reach out via [employer] with questions       Return in about 3 months (around 8/5/2025) for initial compliance .   - Recommend an earlier appointment, if significant treatment barriers develop.  - Patient to follow up with the appropriate healthcare practitioners for all other medical problems and issues.    Please note portions of this record was created using voice recognition software. I have made every reasonable attempt to correct obvious errors, but I expect that there are errors of grammar and possibly content I did not discover before finalizing the note.

## 2025-05-06 ENCOUNTER — APPOINTMENT (OUTPATIENT)
Dept: RADIOLOGY | Facility: MEDICAL CENTER | Age: 25
End: 2025-05-06
Payer: OTHER GOVERNMENT

## 2025-05-06 DIAGNOSIS — N92.1 MENORRHAGIA WITH IRREGULAR CYCLE: ICD-10-CM

## 2025-05-06 PROCEDURE — 76830 TRANSVAGINAL US NON-OB: CPT

## 2025-05-07 DIAGNOSIS — N83.201 CYST OF RIGHT OVARY: ICD-10-CM

## 2025-05-12 ENCOUNTER — APPOINTMENT (OUTPATIENT)
Dept: RADIOLOGY | Facility: MEDICAL CENTER | Age: 25
End: 2025-05-12
Attending: FAMILY MEDICINE
Payer: OTHER GOVERNMENT

## 2025-05-12 DIAGNOSIS — R74.01 ELEVATED ALT MEASUREMENT: ICD-10-CM

## 2025-05-12 PROCEDURE — 76705 ECHO EXAM OF ABDOMEN: CPT

## 2025-05-13 ENCOUNTER — RESULTS FOLLOW-UP (OUTPATIENT)
Dept: MEDICAL GROUP | Facility: LAB | Age: 25
End: 2025-05-13
Payer: OTHER GOVERNMENT

## 2025-05-16 ASSESSMENT — ENCOUNTER SYMPTOMS: ADDITIONAL INFORMATION: QUALITY:     AGGRAVATING FACTORS:    RELIEVING FACTORS:

## 2025-05-16 NOTE — OP THERAPY EVALUATION
"  Outpatient Physical Therapy  INITIAL EVALUATION    [facility]  [address]  [place] [address]  Phone:  [phone]  Fax:  [phone]    Date of Evaluation: 05/19/2025    Patient: Ning Luis  YOB: 2000  MRN: [id number]     Referring Provider: Regis Lee M.D.  [address]  [place] [address]   Referring Diagnosis Chronic bilateral thoracic back pain [M54.6, G89.29]     Time Calculation                 Chief Complaint: No chief complaint on file.    {No diagnosis found. (Refresh or delete this SmartLink)}    Date of onset of impairment: No data found    Subjective:   History of Present Illness:     Mechanism of injury:  Pt is a 24 y.o female presenting to physical therapy with complaints of chronic t/s back pain.     Per MD on 01/17/2025:\"Chronic bilateral thoracic and upper lumbar pain for many years.  Orthopedics with clear thoracic x-ray and supportive measures recommended but she has not been improving.  Referral to PT.\"    Pt denies changes in bowel/bladder movements, saddle anesthesia, significant weight changes, chills/night sweats/fever, nausea and vomiting. Pt consents to evaluation and treatment today.  Pain:     Pain Comments::  Quality:     Aggravating Factors:    Relieving Factors:    Activities of Daily Living:     Patient reported ADL status: Pt reported ADLs:  Work:  Exercise:  Hobbies:    Assistive Devices:        Past Medical History[1]  Past Surgical History[2]  Social History     Tobacco Use    Smoking status: Never    Smokeless tobacco: Never   Substance Use Topics    Alcohol use: Yes     Alcohol/week: 1.8 oz     Types: 3 Standard drinks or equivalent per week     Comment: occ     Social Hx - Family and Occupational[3]    Objective    Exercises/Treatment  Time-based treatments/modalities:           Assessment, Response and Plan:   Assessment details:  Pt is a 24 y.o " female presenting to physical therapy with complaints of chronic t/s back pain.    Pt educated on exam findings and future POC, pt acknowledged understanding and is agreeable. Pt will benefit from skilled physical therapy to address the following impairments in order to improve functional mobility and overall QOL.  Pt should progress well towards goals if compliant with HEP and POC.     Goals:   Short Term Goals:   1. Pt will report independence and compliance with written HEP   Short term goal time span:  2-4 weeks      Long Term Goals:    1. Pt will report independence and compliance with written HEP     Pt will have a significant improvement in RMQ with MCID of >/= to 5 points (eval:  )    Long term goal time span:  6-8 weeks    Plan:   Therapy options:  Physical therapy treatment to continue  Frequency: 1-2x per week.  Duration in weeks:  8  Discussed with:  Patient  Plan details:  UPOC: 07/15/2025      Functional Assessment Used        Referring provider co-signature:  I have reviewed this plan of care and my co-signature certifies the need for services.    Certification Period: 05/19/2025 to  07/15/25    Physician Signature: ________________________________ Date: ______________                      [1] No past medical history on file.  [2]   Past Surgical History:  Procedure Laterality Date    DENTAL EXTRACTION(S)      FOOT SURGERY Right     shaving of bone   [3]   Family and Occupational History  Socioeconomic History    Marital status: Single     Spouse name: Not on file    Number of children: Not on file    Years of education: Not on file    Highest education level: Not on file   Occupational History    Not on file

## 2025-05-19 ENCOUNTER — APPOINTMENT (OUTPATIENT)
Dept: PHYSICAL THERAPY | Facility: MEDICAL CENTER | Age: 25
End: 2025-05-19
Attending: FAMILY MEDICINE
Payer: OTHER GOVERNMENT